# Patient Record
Sex: FEMALE | Race: BLACK OR AFRICAN AMERICAN | NOT HISPANIC OR LATINO | URBAN - METROPOLITAN AREA
[De-identification: names, ages, dates, MRNs, and addresses within clinical notes are randomized per-mention and may not be internally consistent; named-entity substitution may affect disease eponyms.]

---

## 2021-03-24 ENCOUNTER — EMERGENCY (EMERGENCY)
Facility: HOSPITAL | Age: 51
LOS: 1 days | Discharge: HOME | End: 2021-03-24
Attending: EMERGENCY MEDICINE | Admitting: EMERGENCY MEDICINE
Payer: COMMERCIAL

## 2021-03-24 VITALS
OXYGEN SATURATION: 97 % | WEIGHT: 149.91 LBS | RESPIRATION RATE: 18 BRPM | DIASTOLIC BLOOD PRESSURE: 96 MMHG | SYSTOLIC BLOOD PRESSURE: 163 MMHG | HEART RATE: 73 BPM | TEMPERATURE: 98 F

## 2021-03-24 DIAGNOSIS — Z98.890 OTHER SPECIFIED POSTPROCEDURAL STATES: ICD-10-CM

## 2021-03-24 DIAGNOSIS — M89.8X1 OTHER SPECIFIED DISORDERS OF BONE, SHOULDER: ICD-10-CM

## 2021-03-24 DIAGNOSIS — M54.9 DORSALGIA, UNSPECIFIED: ICD-10-CM

## 2021-03-24 DIAGNOSIS — Z98.890 OTHER SPECIFIED POSTPROCEDURAL STATES: Chronic | ICD-10-CM

## 2021-03-24 DIAGNOSIS — Z87.09 PERSONAL HISTORY OF OTHER DISEASES OF THE RESPIRATORY SYSTEM: ICD-10-CM

## 2021-03-24 DIAGNOSIS — R09.1 PLEURISY: ICD-10-CM

## 2021-03-24 LAB
ALBUMIN SERPL ELPH-MCNC: 3.7 G/DL — SIGNIFICANT CHANGE UP (ref 3.4–5)
ALP SERPL-CCNC: 81 U/L — SIGNIFICANT CHANGE UP (ref 40–120)
ALT FLD-CCNC: 25 U/L — SIGNIFICANT CHANGE UP (ref 12–42)
ANION GAP SERPL CALC-SCNC: 4 MMOL/L — LOW (ref 9–16)
AST SERPL-CCNC: 21 U/L — SIGNIFICANT CHANGE UP (ref 15–37)
BASOPHILS # BLD AUTO: 0.02 K/UL — SIGNIFICANT CHANGE UP (ref 0–0.2)
BASOPHILS NFR BLD AUTO: 0.4 % — SIGNIFICANT CHANGE UP (ref 0–2)
BILIRUB SERPL-MCNC: 0.2 MG/DL — SIGNIFICANT CHANGE UP (ref 0.2–1.2)
BUN SERPL-MCNC: 14 MG/DL — SIGNIFICANT CHANGE UP (ref 7–23)
CALCIUM SERPL-MCNC: 10 MG/DL — SIGNIFICANT CHANGE UP (ref 8.5–10.5)
CHLORIDE SERPL-SCNC: 105 MMOL/L — SIGNIFICANT CHANGE UP (ref 96–108)
CO2 SERPL-SCNC: 32 MMOL/L — HIGH (ref 22–31)
CREAT SERPL-MCNC: 0.78 MG/DL — SIGNIFICANT CHANGE UP (ref 0.5–1.3)
D DIMER BLD IA.RAPID-MCNC: <187 NG/ML DDU — SIGNIFICANT CHANGE UP
EOSINOPHIL # BLD AUTO: 0.05 K/UL — SIGNIFICANT CHANGE UP (ref 0–0.5)
EOSINOPHIL NFR BLD AUTO: 1.1 % — SIGNIFICANT CHANGE UP (ref 0–6)
GLUCOSE SERPL-MCNC: 113 MG/DL — HIGH (ref 70–99)
HCG SERPL-ACNC: <1 MIU/ML — SIGNIFICANT CHANGE UP
HCT VFR BLD CALC: 35.8 % — SIGNIFICANT CHANGE UP (ref 34.5–45)
HGB BLD-MCNC: 11.4 G/DL — LOW (ref 11.5–15.5)
IMM GRANULOCYTES NFR BLD AUTO: 0.2 % — SIGNIFICANT CHANGE UP (ref 0–1.5)
LIDOCAIN IGE QN: 127 U/L — SIGNIFICANT CHANGE UP (ref 73–393)
LYMPHOCYTES # BLD AUTO: 2 K/UL — SIGNIFICANT CHANGE UP (ref 1–3.3)
LYMPHOCYTES # BLD AUTO: 44.3 % — HIGH (ref 13–44)
MCHC RBC-ENTMCNC: 28.1 PG — SIGNIFICANT CHANGE UP (ref 27–34)
MCHC RBC-ENTMCNC: 31.8 GM/DL — LOW (ref 32–36)
MCV RBC AUTO: 88.2 FL — SIGNIFICANT CHANGE UP (ref 80–100)
MONOCYTES # BLD AUTO: 0.53 K/UL — SIGNIFICANT CHANGE UP (ref 0–0.9)
MONOCYTES NFR BLD AUTO: 11.8 % — SIGNIFICANT CHANGE UP (ref 2–14)
NEUTROPHILS # BLD AUTO: 1.9 K/UL — SIGNIFICANT CHANGE UP (ref 1.8–7.4)
NEUTROPHILS NFR BLD AUTO: 42.2 % — LOW (ref 43–77)
NRBC # BLD: 0 /100 WBCS — SIGNIFICANT CHANGE UP (ref 0–0)
PLATELET # BLD AUTO: 277 K/UL — SIGNIFICANT CHANGE UP (ref 150–400)
POTASSIUM SERPL-MCNC: 4.7 MMOL/L — SIGNIFICANT CHANGE UP (ref 3.5–5.3)
POTASSIUM SERPL-SCNC: 4.7 MMOL/L — SIGNIFICANT CHANGE UP (ref 3.5–5.3)
PROT SERPL-MCNC: 7.5 G/DL — SIGNIFICANT CHANGE UP (ref 6.4–8.2)
RBC # BLD: 4.06 M/UL — SIGNIFICANT CHANGE UP (ref 3.8–5.2)
RBC # FLD: 13 % — SIGNIFICANT CHANGE UP (ref 10.3–14.5)
SODIUM SERPL-SCNC: 141 MMOL/L — SIGNIFICANT CHANGE UP (ref 132–145)
WBC # BLD: 4.51 K/UL — SIGNIFICANT CHANGE UP (ref 3.8–10.5)
WBC # FLD AUTO: 4.51 K/UL — SIGNIFICANT CHANGE UP (ref 3.8–10.5)

## 2021-03-24 PROCEDURE — 74174 CTA ABD&PLVS W/CONTRAST: CPT | Mod: 26

## 2021-03-24 PROCEDURE — 71275 CT ANGIOGRAPHY CHEST: CPT | Mod: 26

## 2021-03-24 PROCEDURE — 99285 EMERGENCY DEPT VISIT HI MDM: CPT

## 2021-03-24 RX ORDER — MORPHINE SULFATE 50 MG/1
4 CAPSULE, EXTENDED RELEASE ORAL ONCE
Refills: 0 | Status: DISCONTINUED | OUTPATIENT
Start: 2021-03-24 | End: 2021-03-24

## 2021-03-24 RX ORDER — ONDANSETRON 8 MG/1
4 TABLET, FILM COATED ORAL ONCE
Refills: 0 | Status: COMPLETED | OUTPATIENT
Start: 2021-03-24 | End: 2021-03-24

## 2021-03-24 RX ADMIN — MORPHINE SULFATE 4 MILLIGRAM(S): 50 CAPSULE, EXTENDED RELEASE ORAL at 23:00

## 2021-03-24 RX ADMIN — ONDANSETRON 4 MILLIGRAM(S): 8 TABLET, FILM COATED ORAL at 21:52

## 2021-03-24 RX ADMIN — MORPHINE SULFATE 4 MILLIGRAM(S): 50 CAPSULE, EXTENDED RELEASE ORAL at 21:52

## 2021-03-24 NOTE — ED PROVIDER NOTE - OBJECTIVE STATEMENT
50 yof pw pain along mid/upper scapula R, which began around 2am.  denies any sx prior to going to bed.  +pleurisy, worse w/ certain body movement.  no abd pain, no cp, no nv.  last ate a cheeseburger around 3pm.  pain intermittently waxing/waning.  denies any paresthesia to arms/legs.  no neck pain, no ha, no blurry vision.  reports similar feeling in 2008 when she had myomectomy, found to have fluids in the lung.  pt unsure what the cause was, unsure what was done.  LMP 1 wk ago. 50 yof pw pain along mid/upper scapula R, which began around 2am.  denies any sx prior to going to bed.  +pleurisy, worse w/ certain body movement.  no abd pain, no cp, no nv.  last ate a cheeseburger around 3pm.  pain intermittently waxing/waning.  denies any paresthesia to arms/legs.  no neck pain, no ha, no blurry vision.  reports similar feeling in 2008 when she had myomectomy, found to have fluids in the lung.  pt unsure what the cause was, unsure what was done.  LMP 1 wk ago.  denies any unilateral leg pain/swelling, no immobilization/travel/surg/trauma, no exogenous hormone use, no hx of PE/DVT, no FHx of PE/DVT, 50 yof pw pain along mid/upper scapula R, which began around 2am.  denies any sx prior to going to bed.  +pleurisy, worse w/ certain body movement but no sob.  no abd pain, no cp, no nv.  last ate a cheeseburger around 3pm.  pain intermittently waxing/waning.  denies any paresthesia to arms/legs.  no neck pain, no ha, no blurry vision.  reports similar feeling in 2008 when she had myomectomy, found to have fluids in the lung.  pt unsure what the cause was, unsure what was done.  LMP 1 wk ago.  denies any unilateral leg pain/swelling, no immobilization/travel/surg/trauma, no exogenous hormone use, no hx of PE/DVT, no FHx of PE/DVT, 50 yof pw pain along mid/upper scapula R, which began around 2am.  denies any sx prior to going to bed.  +pleurisy, worse w/ certain body movement but no sob.  no abd pain, no cp, no nv.  last ate a cheeseburger around 3pm.  pain intermittently waxing/waning.  denies any paresthesia to arms/legs.  no neck pain, no ha, no blurry vision.  reports similar feeling in 2008 when she had myomectomy, found to have fluids in the lung.  pt unsure what the cause was, unsure what was done.  LMP 1 wk ago.  denies any unilateral leg pain/swelling, no immobilization/travel/surg/trauma, no exogenous hormone use, no hx of PE/DVT, no FHx of PE/DVT.  denies IVDU, denies hx of shoulder instrumentation.

## 2021-03-24 NOTE — ED PROVIDER NOTE - PATIENT PORTAL LINK FT
You can access the FollowMyHealth Patient Portal offered by [facility] by registering at the following website: [email or web address]. By joining [email or web address] portal, you will also be able to view your health information using other applications (apps) compatible with our system.

## 2021-03-24 NOTE — ED PROVIDER NOTE - CLINICAL SUMMARY MEDICAL DECISION MAKING FREE TEXT BOX
pt w/ pleuritic pain along R mid scapula since 2am, intermittently worsening, +pleurisy, no abd pain/nv, nontender, not worse w/ eating, will check labs including d-dimer, lipase, CTA, analgesia pt w/ pleuritic pain along R mid scapula since 2am, intermittently worsening, +pleurisy, no abd pain/nv, nontender, not worse w/ eating, no unilateral leg pain/swelling, no immobilization/travel/surg/trauma, no exogenous hormone use, no hx of PE/DVT, no FHx of PE/DVT, will check labs including d-dimer, lipase, CTA, analgesia pt w/ pleuritic pain along R mid scapula since 2am, intermittently worsening, +pleurisy w/o sob, no abd pain/nv, nontender, not worse w/ eating, no unilateral leg pain/swelling, no immobilization/travel/surg/trauma, no exogenous hormone use, no hx of PE/DVT, no FHx of PE/DVT, will check labs including d-dimer, lipase, CTA, analgesia    d-dimer neg, no tachypnea/tachycardia/hypoxia, still denies any sob, nonreproducible pain along R scapula, initially hypertensive, will obtain CTA r/o dissection,

## 2021-03-24 NOTE — ED ADULT NURSE NOTE - OBJECTIVE STATEMENT
pt presents to ER with right mid/upper back and scapula pain since 2AM this am, worsening with body movement,  Pt sent from UC to ED to r/o PE. Pt tearful with pursed lip breathing. denies N/V/ SOB/dizziness/CP. Sono with Dr Miles at bedside in progress. Pain 9/10.

## 2021-03-24 NOTE — ED PROVIDER NOTE - PHYSICAL EXAMINATION
Physical Exam  GEN: Awake, alert, non-toxic appearing, NCAT  EYES: full EOMI,  ENT: External inspection normal, normal voice,   HEAD: atraumatic  NECK: FROM neck, supple,   RESP: no tachypnea, no hypoxia, no resp distress,  MSK: FROM of R shoulder but reports pain in the scapula, nontender scapula, nontender c/t/l lumbar spine, nontender deltoid/humerus,   SKIN: no rash overlying R shoulder/scapula/back

## 2021-03-24 NOTE — ED ADULT TRIAGE NOTE - CHIEF COMPLAINT QUOTE
Pt BIBA c/o R upper back pain since 2AM. States pain worsening around 5-6PM, went to UC and had no acute changes on XR and EKG. Urgent care told pt to come to ED for r/o PE. States pain worsens with inspiration. Denies cp, sob, dizziness.

## 2021-03-25 VITALS
RESPIRATION RATE: 18 BRPM | OXYGEN SATURATION: 98 % | HEART RATE: 65 BPM | TEMPERATURE: 98 F | DIASTOLIC BLOOD PRESSURE: 80 MMHG | SYSTOLIC BLOOD PRESSURE: 130 MMHG

## 2021-03-25 LAB
BASO STIPL BLD QL SMEAR: PRESENT — SIGNIFICANT CHANGE UP
BASOPHILS # BLD AUTO: 0.03 K/UL — SIGNIFICANT CHANGE UP (ref 0–0.2)
BASOPHILS NFR BLD AUTO: 1.2 % — SIGNIFICANT CHANGE UP (ref 0–2)
CRP SERPL-MCNC: 9 MG/L — SIGNIFICANT CHANGE UP (ref 0–9)
EOSINOPHIL # BLD AUTO: 0.02 K/UL — SIGNIFICANT CHANGE UP (ref 0–0.5)
EOSINOPHIL NFR BLD AUTO: 0.8 % — SIGNIFICANT CHANGE UP (ref 0–6)
GIANT PLATELETS BLD QL SMEAR: PRESENT — SIGNIFICANT CHANGE UP
HCT VFR BLD CALC: 33.9 % — LOW (ref 34.5–45)
HGB BLD-MCNC: 10.8 G/DL — LOW (ref 11.5–15.5)
IMM GRANULOCYTES NFR BLD AUTO: 0.8 % — SIGNIFICANT CHANGE UP (ref 0–1.5)
LG PLATELETS BLD QL AUTO: SLIGHT — SIGNIFICANT CHANGE UP
LYMPHOCYTES # BLD AUTO: 1.42 K/UL — SIGNIFICANT CHANGE UP (ref 1–3.3)
LYMPHOCYTES # BLD AUTO: 56.8 % — HIGH (ref 13–44)
MACROCYTES BLD QL: SLIGHT — SIGNIFICANT CHANGE UP
MANUAL SMEAR VERIFICATION: SIGNIFICANT CHANGE UP
MCHC RBC-ENTMCNC: 27.8 PG — SIGNIFICANT CHANGE UP (ref 27–34)
MCHC RBC-ENTMCNC: 31.9 GM/DL — LOW (ref 32–36)
MCV RBC AUTO: 87.4 FL — SIGNIFICANT CHANGE UP (ref 80–100)
MONOCYTES # BLD AUTO: 0.08 K/UL — SIGNIFICANT CHANGE UP (ref 0–0.9)
MONOCYTES NFR BLD AUTO: 3.2 % — SIGNIFICANT CHANGE UP (ref 2–14)
NEUTROPHILS # BLD AUTO: 0.93 K/UL — LOW (ref 1.8–7.4)
NEUTROPHILS NFR BLD AUTO: 37.2 % — LOW (ref 43–77)
NRBC # BLD: 0 /100 WBCS — SIGNIFICANT CHANGE UP (ref 0–0)
PLAT MORPH BLD: ABNORMAL
PLATELET # BLD AUTO: 244 K/UL — SIGNIFICANT CHANGE UP (ref 150–400)
RBC # BLD: 3.88 M/UL — SIGNIFICANT CHANGE UP (ref 3.8–5.2)
RBC # FLD: 13.2 % — SIGNIFICANT CHANGE UP (ref 10.3–14.5)
RBC BLD AUTO: ABNORMAL
SARS-COV-2 RNA SPEC QL NAA+PROBE: SIGNIFICANT CHANGE UP
WBC # BLD: 2.5 K/UL — LOW (ref 3.8–10.5)
WBC # FLD AUTO: 2.5 K/UL — LOW (ref 3.8–10.5)

## 2021-03-25 RX ORDER — KETOROLAC TROMETHAMINE 30 MG/ML
15 SYRINGE (ML) INJECTION ONCE
Refills: 0 | Status: DISCONTINUED | OUTPATIENT
Start: 2021-03-25 | End: 2021-03-25

## 2021-03-25 RX ADMIN — Medication 15 MILLIGRAM(S): at 02:31

## 2021-03-25 RX ADMIN — Medication 15 MILLIGRAM(S): at 00:59

## 2021-03-25 NOTE — ED ADULT NURSE REASSESSMENT NOTE - NS ED NURSE REASSESS COMMENT FT1
Patient remains comfortably in stretcher asleep. Medicated with pain medication as ordered by md. Safety and comfort measures in place and maintained. Awaiting further disposition.

## 2021-07-26 ENCOUNTER — EMERGENCY (EMERGENCY)
Facility: HOSPITAL | Age: 51
LOS: 1 days | Discharge: HOME | End: 2021-07-26
Admitting: EMERGENCY MEDICINE
Payer: COMMERCIAL

## 2021-07-26 VITALS
HEIGHT: 64 IN | RESPIRATION RATE: 16 BRPM | SYSTOLIC BLOOD PRESSURE: 147 MMHG | DIASTOLIC BLOOD PRESSURE: 81 MMHG | OXYGEN SATURATION: 99 % | HEART RATE: 81 BPM | WEIGHT: 145.06 LBS | TEMPERATURE: 98 F

## 2021-07-26 DIAGNOSIS — M54.30 SCIATICA, UNSPECIFIED SIDE: ICD-10-CM

## 2021-07-26 DIAGNOSIS — Z87.09 PERSONAL HISTORY OF OTHER DISEASES OF THE RESPIRATORY SYSTEM: ICD-10-CM

## 2021-07-26 DIAGNOSIS — D25.9 LEIOMYOMA OF UTERUS, UNSPECIFIED: ICD-10-CM

## 2021-07-26 DIAGNOSIS — Z98.890 OTHER SPECIFIED POSTPROCEDURAL STATES: Chronic | ICD-10-CM

## 2021-07-26 DIAGNOSIS — M25.551 PAIN IN RIGHT HIP: ICD-10-CM

## 2021-07-26 PROCEDURE — 99284 EMERGENCY DEPT VISIT MOD MDM: CPT

## 2021-07-26 PROCEDURE — 93971 EXTREMITY STUDY: CPT | Mod: 26,RT

## 2021-07-26 RX ORDER — METHOCARBAMOL 500 MG/1
2 TABLET, FILM COATED ORAL
Qty: 30 | Refills: 0
Start: 2021-07-26 | End: 2021-07-30

## 2021-07-26 RX ORDER — OXYCODONE AND ACETAMINOPHEN 5; 325 MG/1; MG/1
1 TABLET ORAL
Qty: 10 | Refills: 0
Start: 2021-07-26

## 2021-07-26 RX ORDER — OXYCODONE AND ACETAMINOPHEN 5; 325 MG/1; MG/1
1 TABLET ORAL ONCE
Refills: 0 | Status: DISCONTINUED | OUTPATIENT
Start: 2021-07-26 | End: 2021-07-26

## 2021-07-26 RX ORDER — METHOCARBAMOL 500 MG/1
1500 TABLET, FILM COATED ORAL ONCE
Refills: 0 | Status: COMPLETED | OUTPATIENT
Start: 2021-07-26 | End: 2021-07-26

## 2021-07-26 NOTE — ED ADULT TRIAGE NOTE - CHIEF COMPLAINT QUOTE
Walks into ED sent from [facility] c/o right lower extremity pain that started gradually 1 week pta, with peak severity last night. Denies relevant pmh, no prior DVT's.

## 2021-07-26 NOTE — ED PROVIDER NOTE - PATIENT PORTAL LINK FT
You can access the FollowMyHealth Patient Portal offered by [facility] by registering at the following website: [email or web address]. By joining [email or web address]’s FollowMyHealth portal, you will also be able to view your health information using other applications (apps) compatible with our system.

## 2021-07-26 NOTE — ED ADULT NURSE NOTE - OBJECTIVE STATEMENT
50y female presents to ED c/o right leg. Pt states pain has been present x1week, starts in right hip and radiates down leg. Hx of sciatica, takes aleve for pain, has not felt relief. Pedal pulses strong and present. Denies sob, hormone use, recent travel. A&Ox4.

## 2021-07-26 NOTE — ED PROVIDER NOTE - CARE PROVIDER_API CALL
Josh Kern)  Neurology  [address], [place] [address]  Phone: [phone]  Fax: [phone]  Follow Up Time: 1-3 Days

## 2021-07-26 NOTE — ED PROVIDER NOTE - IV ALTEPLASE EXCL REL HIDDEN
show
Pt reports good PO intake and appetite PTA, consumes regular diet but tries to watch intake of high sodium and sugary foods. Confirms NKFA. Pt denies chewing/swallowing difficulty, nausea, vomiting, diarrhea, constipation PTA. Takes vitamin D and vitamin B12 supplements

## 2021-07-26 NOTE — ED PROVIDER NOTE - OBJECTIVE STATEMENT
49 y/o female with PMH of Sciatica and Fibroids presents to the ED with 1 week of progressive pain originating from her right hip/buttock region and radiating down the RLE with associated paresthesias. The pain is moderate-to-severe in nature, making it painful to walk at times. She has taken Aleve for the pain but reports only minimal improvement so she went to [facility] where she was referred here for further evaluation.    Denies fever, chills, headache, dizziness, syncope, CP, SOB, palpitations, saddle anesthesia, LE weakness, urinary or bowel incontinence, recent fall or injury

## 2021-07-26 NOTE — ED PROVIDER NOTE - CLINICAL SUMMARY MEDICAL DECISION MAKING FREE TEXT BOX
US negative for DVT. Clinical presentation is c/w likely sciatica of the right side. No focal deficits on exam. Pt is well-appearing and sitting comfortably in NAD. Supportive care instructions, Rx analgesics, muscle relaxants and F/U with Neuro this week. Strict return precautions reviewed with pt in which pt verbalizes understanding and agrees to.

## 2021-07-27 PROBLEM — Z00.00 ENCOUNTER FOR PREVENTIVE HEALTH EXAMINATION: Status: ACTIVE | Noted: 2021-07-27

## 2021-07-27 RX ADMIN — METHOCARBAMOL 1500 MILLIGRAM(S): 500 TABLET, FILM COATED ORAL at 00:15

## 2021-07-27 RX ADMIN — OXYCODONE AND ACETAMINOPHEN 1 TABLET(S): 5; 325 TABLET ORAL at 00:15

## 2021-08-12 ENCOUNTER — APPOINTMENT (OUTPATIENT)
Dept: NEUROSURGERY | Facility: CLINIC | Age: 51
End: 2021-08-12
Payer: COMMERCIAL

## 2021-08-12 VITALS — HEIGHT: 64 IN | WEIGHT: 145 LBS | BODY MASS INDEX: 24.75 KG/M2

## 2021-08-12 PROCEDURE — 99203 OFFICE O/P NEW LOW 30 MIN: CPT

## 2021-08-13 PROBLEM — M54.30 SCIATICA, UNSPECIFIED SIDE: Chronic | Status: ACTIVE | Noted: 2021-07-26

## 2021-08-13 PROBLEM — D21.9 BENIGN NEOPLASM OF CONNECTIVE AND OTHER SOFT TISSUE, UNSPECIFIED: Chronic | Status: ACTIVE | Noted: 2021-07-26

## 2021-09-30 ENCOUNTER — NON-APPOINTMENT (OUTPATIENT)
Age: 51
End: 2021-09-30

## 2021-09-30 ENCOUNTER — APPOINTMENT (OUTPATIENT)
Dept: PHYSICAL MEDICINE AND REHAB | Facility: CLINIC | Age: 51
End: 2021-09-30
Payer: COMMERCIAL

## 2021-09-30 VITALS
SYSTOLIC BLOOD PRESSURE: 127 MMHG | RESPIRATION RATE: 18 BRPM | BODY MASS INDEX: 24.75 KG/M2 | TEMPERATURE: 98.2 F | HEART RATE: 87 BPM | WEIGHT: 145 LBS | HEIGHT: 64 IN | DIASTOLIC BLOOD PRESSURE: 85 MMHG

## 2021-09-30 DIAGNOSIS — M94.0 CHONDROCOSTAL JUNCTION SYNDROME [TIETZE]: ICD-10-CM

## 2021-09-30 DIAGNOSIS — M54.16 RADICULOPATHY, LUMBAR REGION: ICD-10-CM

## 2021-09-30 DIAGNOSIS — Z83.3 FAMILY HISTORY OF DIABETES MELLITUS: ICD-10-CM

## 2021-09-30 DIAGNOSIS — M79.18 MYALGIA, OTHER SITE: ICD-10-CM

## 2021-09-30 DIAGNOSIS — M89.8X8 OTHER SPECIFIED DISORDERS OF BONE, OTHER SITE: ICD-10-CM

## 2021-09-30 DIAGNOSIS — Z86.2 PERSONAL HISTORY OF DISEASES OF THE BLOOD AND BLOOD-FORMING ORGANS AND CERTAIN DISORDERS INVOLVING THE IMMUNE MECHANISM: ICD-10-CM

## 2021-09-30 DIAGNOSIS — R52 PAIN, UNSPECIFIED: ICD-10-CM

## 2021-09-30 PROCEDURE — 99244 OFF/OP CNSLTJ NEW/EST MOD 40: CPT

## 2021-09-30 NOTE — REVIEW OF SYSTEMS
[Patient Intake Form Reviewed] : Patient intake form was reviewed [Fever] : no fever [Chills] : no chills [Fatigue] : no fatigue [Recent Change In Weight] : ~T no recent weight change [Leg Claudication] : no intermittent leg claudication [Lower Ext Edema] : no lower extremity edema [Muscle Pain] : muscle pain [Muscle Weakness] : no muscle weakness [Difficulty Walking] : no difficulty walking [Negative] : Heme/Lymph

## 2021-09-30 NOTE — ASSESSMENT
[FreeTextEntry1] : \par PLAN AND RECOMMENDATIONS :\par \par We discussed differential diagnosis and clinical impression\par agree with EMG assess LS radiculopathy \par \par Recommend\par .symptomatic care and support\par  medications NSAIDS as needed -  OTC fine (-personal preference )-(once or twice a day), -warned of  possible GI side effects -advised to take with meals or add over the counter Nexium, if sensitive\par \par  imaging done await MRI to correlate with EMG \par \par  hydrotherapy /heat / cold for pain\par  continue  spinal precautions including care with bending, lifting, twisting and  carrying.\par \par \par  relative rest and avoidance of painful activity where possible \par  increasing activity as discussed \par  return for EMG \par Information given to patient about EMG and Nerve Conduction Study Examination including  planning, differential diagnosis to rule in /rule out ,duration of the test ,precautions (if patient on blood thinner.has bleeding disorder or  pace maker device etc -still possible to undergo with care), side effects(benign-limited to short term bruising and discomfort/pain)  \par The protocol of temp checks upon arrival ,disinfection procedure of waiting room and the lab explained- reassured. \par All questions answered. \par Patient instructed to book appointment upon conclusion of appointment\par \par Information sheet ' Answers to your Questions on EMG " forwarded to patient to read prior to testing, with further information about training,background and the procedure itself .\par \par \par I certify that > 50 % of time spent was spent on counselling and coordination of care and more than 50% face to face directly \par Time spent including review of documents /records/decision making /discussion  amounted  > 60 minutes\par

## 2021-09-30 NOTE — CONSULT LETTER
[FreeTextEntry1] : Dear Dr. RANCHO LAU  , Dr CORIE HAWLEY \par \par I had the pleasure of evaluating your patient, SEVERO ACEVEDO .\par \par Thank you very much for allowing me to participate in the care of this patient. If you have any questions, please do not hesitate to contact me. \par \par Sincerely, \par Oscar Cordon MD \par \par ABPMR Board Certified in Physical Medicine and Rehabilitation\par Certified Fellow of AANEM (Neuromuscular and Electrodiagnostic Medicine)\par Subspecialty certified in Sports Medicine (ABPMR)\par \par  of Physical Medicine and Rehabilitation\par [facility] [employer] [facility]\par [facility]\par \par

## 2021-09-30 NOTE — HISTORY OF PRESENT ILLNESS
[FreeTextEntry1] : Ms. SEVERO ACEVEDO is a very pleasant 51 year female seen for evaluation of low back pain radiating to the R  lower extremity that has been ongoing for a long time but very bad last 6 months -also has been shooting down left leg but now mainly R   without any specific injury or inciting event. The pain is located primarily R upper lumbar gluteal area iliac crest  intermittent in nature and described as sharp cramping  . The pain is rated as 2/10 during today's visit, and ranges from 2-8/10. The patient's symptoms are aggravated by walking bj getting up from a chair she had such bad spasm could not move 2 months ago went to ER   and alleviated by rest and medication  . The patient denies any numbness/tingling, weakness, or bowel/bladder dysfunction. 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

## 2021-09-30 NOTE — PHYSICAL EXAM
[FreeTextEntry1] : PHYSICAL EXAM : OBJECTIVE \par \par GENERAL : Awake ,alert and oriented to time place and person \par HEAD : normocephalic and atraumatic \par NECK : supple ,no tracheal deviation ,no thyroid enlargement noted with swallowing\par EYES : sclera and conjunctiva normal no redness,intact extraocular movements \par ENT  : ears and nose normal in appearance -hearing adequate \par PULMONARY: effort normal. No respiratory distress. breathing regular. No wheezes \par LYMPH : No swelling in limbs, capillary return within normal range \par CVS : warm extremities,no palpitations,not short of breath, no visible jugular venous distention\par PSYCH : mood and affect normal ,good eye contact ,normal attention \par ABDOMEN : no visible distension , \par NEUROLOGICAL:cranial nerves intact,muscle tone normal,gait and balance safe except where noted below \par SKIN : warm and dry No rash detected over specific body areas examined \par MUSCULOSKELETAL: normal muscle bulk, no focal bony tenderness /posture normal except where specified below\par ROM spine FF 40 pain \par SLR neg R and L \par hip ROM good \par No long tract signs found on clinical exam and no focal neurological deficits\par EHL 5/5 \par vibration intact \par tender over R iliac crest as well as lower rib margins \par Pepper neg \par \par reflexes NL no clonus no hoffmans \par gait NL \par not much pain today during exam except with palpation she is tender

## 2021-10-18 ENCOUNTER — APPOINTMENT (OUTPATIENT)
Dept: PHYSICAL MEDICINE AND REHAB | Facility: CLINIC | Age: 51
End: 2021-10-18
Payer: COMMERCIAL

## 2021-10-18 VITALS
HEART RATE: 78 BPM | BODY MASS INDEX: 24.75 KG/M2 | RESPIRATION RATE: 18 BRPM | DIASTOLIC BLOOD PRESSURE: 80 MMHG | TEMPERATURE: 97.7 F | SYSTOLIC BLOOD PRESSURE: 121 MMHG | HEIGHT: 64 IN | WEIGHT: 145 LBS

## 2021-10-18 PROCEDURE — 95886 MUSC TEST DONE W/N TEST COMP: CPT

## 2021-10-18 PROCEDURE — 95911 NRV CNDJ TEST 9-10 STUDIES: CPT

## 2021-12-09 ENCOUNTER — APPOINTMENT (OUTPATIENT)
Dept: NEUROSURGERY | Facility: CLINIC | Age: 51
End: 2021-12-09
Payer: COMMERCIAL

## 2021-12-09 PROCEDURE — 99441: CPT

## 2021-12-09 NOTE — HISTORY OF PRESENT ILLNESS
[FreeTextEntry1] : Reviewed MRI and EMG with patient.  \par \par MRI lumbar spine no abnormality; large fibroid uterus.\par \par There is no indication for intervention on the patient's spine.  I believe she would benefit from treating her fibroid uterus.\par \par Please see EMG report in chart.\par \par She will follow-up as needed.\par \par This consultation took 5 minutes.

## 2021-12-28 ENCOUNTER — APPOINTMENT (OUTPATIENT)
Dept: RHEUMATOLOGY | Facility: CLINIC | Age: 51
End: 2021-12-28
Payer: COMMERCIAL

## 2021-12-28 ENCOUNTER — NON-APPOINTMENT (OUTPATIENT)
Age: 51
End: 2021-12-28

## 2021-12-28 VITALS
HEART RATE: 75 BPM | DIASTOLIC BLOOD PRESSURE: 79 MMHG | SYSTOLIC BLOOD PRESSURE: 123 MMHG | HEIGHT: 64 IN | BODY MASS INDEX: 25.61 KG/M2 | OXYGEN SATURATION: 99 % | WEIGHT: 150 LBS | TEMPERATURE: 97.8 F

## 2021-12-28 DIAGNOSIS — M54.16 RADICULOPATHY, LUMBAR REGION: ICD-10-CM

## 2021-12-28 DIAGNOSIS — M25.471 EFFUSION, RIGHT ANKLE: ICD-10-CM

## 2021-12-28 DIAGNOSIS — M25.472 EFFUSION, RIGHT ANKLE: ICD-10-CM

## 2021-12-28 DIAGNOSIS — I87.2 VENOUS INSUFFICIENCY (CHRONIC) (PERIPHERAL): ICD-10-CM

## 2021-12-28 DIAGNOSIS — M25.69 STIFFNESS OF OTHER SPECIFIED JOINT, NOT ELSEWHERE CLASSIFIED: ICD-10-CM

## 2021-12-28 DIAGNOSIS — D25.9 LEIOMYOMA OF UTERUS, UNSPECIFIED: ICD-10-CM

## 2021-12-28 PROCEDURE — 99205 OFFICE O/P NEW HI 60 MIN: CPT | Mod: 25

## 2021-12-28 PROCEDURE — 36415 COLL VENOUS BLD VENIPUNCTURE: CPT

## 2021-12-28 RX ORDER — MV-MIN/FOLIC/VIT K/LYCOP/COQ10 200-100MCG
CAPSULE ORAL
Refills: 0 | Status: ACTIVE | COMMUNITY

## 2021-12-29 PROBLEM — D25.9 UTERINE LEIOMYOMA, UNSPECIFIED LOCATION: Status: ACTIVE | Noted: 2021-12-28

## 2021-12-29 PROBLEM — M54.16 LUMBAR POLYRADICULOPATHY: Status: ACTIVE | Noted: 2021-10-18

## 2021-12-29 PROBLEM — M25.471 SWOLLEN ANKLES: Status: ACTIVE | Noted: 2021-10-18

## 2021-12-29 PROBLEM — I87.2 VENOUS INSUFFICIENCY: Status: ACTIVE | Noted: 2021-12-29

## 2021-12-29 NOTE — HISTORY OF PRESENT ILLNESS
[Arthralgias] : arthralgias [Joint Swelling] : joint swelling [FreeTextEntry1] : Referred by Dr. Oscar Cordon for Rheumatology consultation \par \par 50 y/o very pleasant  F was referred for b/l chronic  ankle swelling.\par Has hx thyroid nodule gets monitoring,  pleuritis after myomectomy surgery in 2008 \par chronic low back pain radiating to the R >left LE/ sciatica symptoms. \par EMG b/l lumbosacral polyradiculopathy ,  without peripheral neuropathy.  MRI negative for disc bulge, canal stenosis or or neural foramen  narrowing, found enlarged uterus with multiple fibroids. Saw NeuroSx who did not recommend any intervention as has normal MRI and though her symptoms are due to fibroids and suggested gyn evaluation which has been pending. \par Has chronic ankle swelling recurrent, worse during summer time, had xrays done and told normal in the past. \par ankle and dorsal foot swelling. \par Pt describes LE/ankle heavy sensation without pain worse toward the end of the day, she has less symptoms or no symptoms in the morning. \par sometimes hip pain  and walking immediately after sitting for long period of time is difficult. Low back pain or hip pain does not improve after activity and does not wake up at night with pain. \par Fingertips feels dry despite using moisturizing lotion. \par Does not exercise \par \par No h/o morning stiffness, memory loss, patchy hair loss, sicca symptoms, photosensitivity, HA,  Raynaud's, oral ulcers, nasal ulcers. \par No history of pericarditis \par No history of protein/hematuria \par No history of cytopenias. \par No Hx of VTE/DVT/PE\par Personal or family hx of autoimmune disease, no hx of psoriasis\par  [Anorexia] : no anorexia [Weight Loss] : no weight loss [Malaise] : no malaise [Fever] : no fever [Chills] : no chills [Fatigue] : no fatigue [Depression] : no depression [Malar Facial Rash] : no malar facial rash [Skin Lesions] : no lesions [Skin Nodules] : no skin nodules [Oral Ulcers] : no oral ulcers [Cough] : no cough [Dry Mouth] : no dry mouth [Dysphonia] : no dysphonia [Dysphagia] : no dysphagia [Shortness of Breath] : no shortness of breath [Joint Warmth] : no joint warmth [Joint Deformity] : no joint deformity [Decreased ROM] : no decreased range of motion [Morning Stiffness] : no morning stiffness [Falls] : no falls [Difficulty Standing] : no difficulty standing [Difficulty Walking] : no difficulty walking [Dyspnea] : no dyspnea [Myalgias] : no myalgias [Muscle Weakness] : no muscle weakness [Muscle Spasms] : no muscle spasms [Muscle Cramping] : no muscle cramping [Visual Changes] : no visual changes [Eye Pain] : no eye pain [Eye Redness] : no eye redness [Dry Eyes] : no dry eyes

## 2021-12-29 NOTE — ASSESSMENT
[FreeTextEntry1] : 52 y/o  referred for b/l chronic  ankle swelling for past few years. \par chronic low back pain radiating to the R >left LE/ sciatica symptoms. \par EMG b/l lumbosacral polyradiculopathy.  MRI negative for disc bulge, canal stenosis or or neural foramen  narrowing, found enlarged uterus with multiple fibroid, pending  gyn evaluation. \par At present pt does not have LE/ankle edema \par Her ankle swelling is likely due fibroid causing pelvic vein compression or congestion syndrome or LE venous insufficiency \par with negative ROS and exam I have low suspicious for underlying autoimmune disease/inflammatory arthritis to explain her presentation. \par I will refer pt to vascular Dr. Olivares and strongly recommended to make appt with gyn to discuss fibroid treatment options. \par We talked to check basic rheum labs and xray ankle for justification. ( labs done during the visit today) \par \par f/u in 6 weeks to review labs/images \par  \par

## 2021-12-29 NOTE — REVIEW OF SYSTEMS
[Pelvic Pain] : pelvic pain [Arthralgias] : arthralgias [Joint Swelling] : joint swelling [Limb Pain] : limb pain [Negative] : Gastrointestinal [Itching] : no itching [Dizziness] : no dizziness [Fainting] : no fainting [Easy Bleeding] : no tendency for easy bleeding [Easy Bruising] : no tendency for easy bruising [Swollen Glands] : no swollen glands [Swollen Glands In The Neck] : no swollen glands in the neck

## 2021-12-29 NOTE — PHYSICAL EXAM
[General Appearance - Alert] : alert [General Appearance - In No Acute Distress] : in no acute distress [General Appearance - Well Nourished] : well nourished [General Appearance - Well Developed] : well developed [General Appearance - Well-Appearing] : healthy appearing [Sclera] : the sclera and conjunctiva were normal [Outer Ear] : the ears and nose were normal in appearance [Examination Of The Oral Cavity] : the lips and gums were normal [Oropharynx] : the oropharynx was normal [Neck Appearance] : the appearance of the neck was normal [Neck Cervical Mass (___cm)] : no neck mass was observed [Jugular Venous Distention Increased] : there was no jugular-venous distention [Respiration, Rhythm And Depth] : normal respiratory rhythm and effort [Exaggerated Use Of Accessory Muscles For Inspiration] : no accessory muscle use [Auscultation Breath Sounds / Voice Sounds] : lungs were clear to auscultation bilaterally [Heart Rate And Rhythm] : heart rate was normal and rhythm regular [Heart Sounds] : normal S1 and S2 [Murmurs] : no murmurs [Full Pulse] : the pedal pulses are present [Edema] : there was no peripheral edema [Veins - Varicosity Changes] : there were no varicosital changes [Abdomen Soft] : soft [Cervical Lymph Nodes Enlarged Posterior Bilaterally] : posterior cervical [Cervical Lymph Nodes Enlarged Anterior Bilaterally] : anterior cervical [Supraclavicular Lymph Nodes Enlarged Bilaterally] : supraclavicular [Axillary Lymph Nodes Enlarged Bilaterally] : axillary [No CVA Tenderness] : no ~M costovertebral angle tenderness [No Spinal Tenderness] : no spinal tenderness [Abnormal Walk] : normal gait [Nail Clubbing] : no clubbing  or cyanosis of the fingernails [Motor Tone] : muscle strength and tone were normal [Skin Color & Pigmentation] : normal skin color and pigmentation [Skin Turgor] : normal skin turgor [] : no rash [Skin Lesions] : no skin lesions [Motor Exam] : the motor exam was normal [Oriented To Time, Place, And Person] : oriented to person, place, and time [Impaired Insight] : insight and judgment were intact

## 2022-01-03 LAB
ALBUMIN MFR SERPL ELPH: 61.4 %
ALBUMIN SERPL ELPH-MCNC: 4.7 G/DL
ALBUMIN SERPL-MCNC: 4.5 G/DL
ALBUMIN/GLOB SERPL: 1.6 RATIO
ALP BLD-CCNC: 87 U/L
ALPHA1 GLOB MFR SERPL ELPH: 4 %
ALPHA1 GLOB SERPL ELPH-MCNC: 0.3 G/DL
ALPHA2 GLOB MFR SERPL ELPH: 8.1 %
ALPHA2 GLOB SERPL ELPH-MCNC: 0.6 G/DL
ALT SERPL-CCNC: 19 U/L
ANACR T: NEGATIVE
ANION GAP SERPL CALC-SCNC: 13 MMOL/L
AST SERPL-CCNC: 21 U/L
B-GLOBULIN MFR SERPL ELPH: 12.7 %
B-GLOBULIN SERPL ELPH-MCNC: 0.9 G/DL
BASOPHILS # BLD AUTO: 0.02 K/UL
BASOPHILS NFR BLD AUTO: 0.5 %
BILIRUB SERPL-MCNC: 0.3 MG/DL
BUN SERPL-MCNC: 11 MG/DL
C3 SERPL-MCNC: 146 MG/DL
C4 SERPL-MCNC: 46 MG/DL
CALCIUM SERPL-MCNC: 10.1 MG/DL
CCP AB SER IA-ACNC: <8 UNITS
CHLORIDE SERPL-SCNC: 102 MMOL/L
CO2 SERPL-SCNC: 24 MMOL/L
CREAT SERPL-MCNC: 0.76 MG/DL
CRP SERPL-MCNC: 3 MG/L
DEPRECATED KAPPA LC FREE/LAMBDA SER: 1.14 RATIO
ENA SS-A AB SER IA-ACNC: <0.2 AL
ENA SS-B AB SER IA-ACNC: <0.2 AL
ENDOMYSIUM IGA SER QL: NEGATIVE
ENDOMYSIUM IGA TITR SER: NORMAL
EOSINOPHIL # BLD AUTO: 0.04 K/UL
EOSINOPHIL NFR BLD AUTO: 1 %
ERYTHROCYTE [SEDIMENTATION RATE] IN BLOOD BY WESTERGREN METHOD: 46 MM/HR
FOLATE SERPL-MCNC: 14 NG/ML
GAMMA GLOB FLD ELPH-MCNC: 1 G/DL
GAMMA GLOB MFR SERPL ELPH: 13.8 %
GLUCOSE SERPL-MCNC: 95 MG/DL
HCT VFR BLD CALC: 39.4 %
HGB BLD-MCNC: 11.8 G/DL
IGA SER QL IEP: 172 MG/DL
IGG SER QL IEP: 1234 MG/DL
IGM SER QL IEP: 71 MG/DL
IMM GRANULOCYTES NFR BLD AUTO: 0 %
INTERPRETATION SERPL IEP-IMP: NORMAL
KAPPA LC CSF-MCNC: 1.05 MG/DL
KAPPA LC SERPL-MCNC: 1.2 MG/DL
LYMPHOCYTES # BLD AUTO: 1.86 K/UL
LYMPHOCYTES NFR BLD AUTO: 47.1 %
M PROTEIN SPEC IFE-MCNC: NORMAL
MAN DIFF?: NORMAL
MCHC RBC-ENTMCNC: 26.9 PG
MCHC RBC-ENTMCNC: 29.9 GM/DL
MCV RBC AUTO: 90 FL
MONOCYTES # BLD AUTO: 0.31 K/UL
MONOCYTES NFR BLD AUTO: 7.8 %
NEUTROPHILS # BLD AUTO: 1.72 K/UL
NEUTROPHILS NFR BLD AUTO: 43.6 %
PLATELET # BLD AUTO: 334 K/UL
POTASSIUM SERPL-SCNC: 4.4 MMOL/L
PROT SERPL-MCNC: 7.4 G/DL
RBC # BLD: 4.38 M/UL
RBC # FLD: 14.5 %
RF+CCP IGG SER-IMP: NEGATIVE
RHEUMATOID FACT SER QL: <10 IU/ML
SODIUM SERPL-SCNC: 139 MMOL/L
TSH SERPL-ACNC: 1.34 UIU/ML
TTG IGA SER IA-ACNC: <1.2 U/ML
TTG IGA SER-ACNC: NEGATIVE
URATE SERPL-MCNC: 3.6 MG/DL
VIT B12 SERPL-MCNC: 355 PG/ML
WBC # FLD AUTO: 3.95 K/UL

## 2022-01-10 LAB — HLA-B27 RELATED AG QL: NEGATIVE

## 2022-01-25 ENCOUNTER — APPOINTMENT (OUTPATIENT)
Dept: VASCULAR SURGERY | Facility: CLINIC | Age: 52
End: 2022-01-25

## 2022-02-08 ENCOUNTER — APPOINTMENT (OUTPATIENT)
Dept: RHEUMATOLOGY | Facility: CLINIC | Age: 52
End: 2022-02-08

## 2022-03-03 ENCOUNTER — INPATIENT (INPATIENT)
Facility: HOSPITAL | Age: 52
LOS: 0 days | Discharge: HOME | DRG: 761 | End: 2022-03-04
Attending: GENERAL ACUTE CARE HOSPITAL | Admitting: SURGERY
Payer: COMMERCIAL

## 2022-03-03 VITALS
DIASTOLIC BLOOD PRESSURE: 93 MMHG | RESPIRATION RATE: 17 BRPM | HEIGHT: 64 IN | OXYGEN SATURATION: 98 % | TEMPERATURE: 98 F | HEART RATE: 75 BPM | WEIGHT: 145.06 LBS | SYSTOLIC BLOOD PRESSURE: 150 MMHG

## 2022-03-03 DIAGNOSIS — Z98.890 OTHER SPECIFIED POSTPROCEDURAL STATES: Chronic | ICD-10-CM

## 2022-03-03 LAB
ALBUMIN SERPL ELPH-MCNC: 3.9 G/DL — SIGNIFICANT CHANGE UP (ref 3.4–5)
ALP SERPL-CCNC: 99 U/L — SIGNIFICANT CHANGE UP (ref 40–120)
ALT FLD-CCNC: 28 U/L — SIGNIFICANT CHANGE UP (ref 12–42)
ANION GAP SERPL CALC-SCNC: 6 MMOL/L — LOW (ref 9–16)
APPEARANCE UR: CLEAR — SIGNIFICANT CHANGE UP
AST SERPL-CCNC: 22 U/L — SIGNIFICANT CHANGE UP (ref 15–37)
BASOPHILS # BLD AUTO: 0.03 K/UL — SIGNIFICANT CHANGE UP (ref 0–0.2)
BASOPHILS NFR BLD AUTO: 0.4 % — SIGNIFICANT CHANGE UP (ref 0–2)
BILIRUB SERPL-MCNC: 0.3 MG/DL — SIGNIFICANT CHANGE UP (ref 0.2–1.2)
BILIRUB UR-MCNC: NEGATIVE — SIGNIFICANT CHANGE UP
BUN SERPL-MCNC: 9 MG/DL — SIGNIFICANT CHANGE UP (ref 7–23)
CALCIUM SERPL-MCNC: 10 MG/DL — SIGNIFICANT CHANGE UP (ref 8.5–10.5)
CHLORIDE SERPL-SCNC: 104 MMOL/L — SIGNIFICANT CHANGE UP (ref 96–108)
CO2 SERPL-SCNC: 30 MMOL/L — SIGNIFICANT CHANGE UP (ref 22–31)
COLOR SPEC: YELLOW — SIGNIFICANT CHANGE UP
CREAT SERPL-MCNC: 0.71 MG/DL — SIGNIFICANT CHANGE UP (ref 0.5–1.3)
DIFF PNL FLD: ABNORMAL
EGFR: 102 ML/MIN/1.73M2 — SIGNIFICANT CHANGE UP
EOSINOPHIL # BLD AUTO: 0.02 K/UL — SIGNIFICANT CHANGE UP (ref 0–0.5)
EOSINOPHIL NFR BLD AUTO: 0.3 % — SIGNIFICANT CHANGE UP (ref 0–6)
GLUCOSE SERPL-MCNC: 141 MG/DL — HIGH (ref 70–99)
GLUCOSE UR QL: NEGATIVE — SIGNIFICANT CHANGE UP
HCG SERPL-ACNC: 2 MIU/ML — SIGNIFICANT CHANGE UP
HCG UR QL: NEGATIVE — SIGNIFICANT CHANGE UP
HCT VFR BLD CALC: 39.3 % — SIGNIFICANT CHANGE UP (ref 34.5–45)
HGB BLD-MCNC: 12.4 G/DL — SIGNIFICANT CHANGE UP (ref 11.5–15.5)
IMM GRANULOCYTES NFR BLD AUTO: 0.4 % — SIGNIFICANT CHANGE UP (ref 0–1.5)
KETONES UR-MCNC: NEGATIVE — SIGNIFICANT CHANGE UP
LEUKOCYTE ESTERASE UR-ACNC: NEGATIVE — SIGNIFICANT CHANGE UP
LIDOCAIN IGE QN: 74 U/L — SIGNIFICANT CHANGE UP (ref 73–393)
LYMPHOCYTES # BLD AUTO: 1.35 K/UL — SIGNIFICANT CHANGE UP (ref 1–3.3)
LYMPHOCYTES # BLD AUTO: 17.7 % — SIGNIFICANT CHANGE UP (ref 13–44)
MCHC RBC-ENTMCNC: 27.3 PG — SIGNIFICANT CHANGE UP (ref 27–34)
MCHC RBC-ENTMCNC: 31.6 GM/DL — LOW (ref 32–36)
MCV RBC AUTO: 86.4 FL — SIGNIFICANT CHANGE UP (ref 80–100)
MONOCYTES # BLD AUTO: 0.34 K/UL — SIGNIFICANT CHANGE UP (ref 0–0.9)
MONOCYTES NFR BLD AUTO: 4.5 % — SIGNIFICANT CHANGE UP (ref 2–14)
NEUTROPHILS # BLD AUTO: 5.86 K/UL — SIGNIFICANT CHANGE UP (ref 1.8–7.4)
NEUTROPHILS NFR BLD AUTO: 76.7 % — SIGNIFICANT CHANGE UP (ref 43–77)
NITRITE UR-MCNC: NEGATIVE — SIGNIFICANT CHANGE UP
NRBC # BLD: 0 /100 WBCS — SIGNIFICANT CHANGE UP (ref 0–0)
PH UR: 5.5 — SIGNIFICANT CHANGE UP (ref 5–8)
PLATELET # BLD AUTO: 269 K/UL — SIGNIFICANT CHANGE UP (ref 150–400)
POTASSIUM SERPL-MCNC: 4.6 MMOL/L — SIGNIFICANT CHANGE UP (ref 3.5–5.3)
POTASSIUM SERPL-SCNC: 4.6 MMOL/L — SIGNIFICANT CHANGE UP (ref 3.5–5.3)
PROT SERPL-MCNC: 8 G/DL — SIGNIFICANT CHANGE UP (ref 6.4–8.2)
PROT UR-MCNC: NEGATIVE MG/DL — SIGNIFICANT CHANGE UP
RBC # BLD: 4.55 M/UL — SIGNIFICANT CHANGE UP (ref 3.8–5.2)
RBC # FLD: 13.5 % — SIGNIFICANT CHANGE UP (ref 10.3–14.5)
SARS-COV-2 RNA SPEC QL NAA+PROBE: SIGNIFICANT CHANGE UP
SODIUM SERPL-SCNC: 140 MMOL/L — SIGNIFICANT CHANGE UP (ref 132–145)
SP GR SPEC: >=1.03 — SIGNIFICANT CHANGE UP (ref 1–1.03)
UROBILINOGEN FLD QL: 0.2 E.U./DL — SIGNIFICANT CHANGE UP
WBC # BLD: 7.63 K/UL — SIGNIFICANT CHANGE UP (ref 3.8–10.5)
WBC # FLD AUTO: 7.63 K/UL — SIGNIFICANT CHANGE UP (ref 3.8–10.5)

## 2022-03-03 PROCEDURE — 76830 TRANSVAGINAL US NON-OB: CPT | Mod: 26

## 2022-03-03 PROCEDURE — 76856 US EXAM PELVIC COMPLETE: CPT | Mod: 26

## 2022-03-03 PROCEDURE — 99285 EMERGENCY DEPT VISIT HI MDM: CPT

## 2022-03-03 PROCEDURE — 74177 CT ABD & PELVIS W/CONTRAST: CPT | Mod: 26

## 2022-03-03 RX ORDER — IOHEXOL 300 MG/ML
30 INJECTION, SOLUTION INTRAVENOUS ONCE
Refills: 0 | Status: COMPLETED | OUTPATIENT
Start: 2022-03-03 | End: 2022-03-03

## 2022-03-03 RX ORDER — MORPHINE SULFATE 50 MG/1
4 CAPSULE, EXTENDED RELEASE ORAL ONCE
Refills: 0 | Status: DISCONTINUED | OUTPATIENT
Start: 2022-03-03 | End: 2022-03-03

## 2022-03-03 RX ADMIN — IOHEXOL 30 MILLILITER(S): 300 INJECTION, SOLUTION INTRAVENOUS at 17:24

## 2022-03-03 RX ADMIN — MORPHINE SULFATE 4 MILLIGRAM(S): 50 CAPSULE, EXTENDED RELEASE ORAL at 22:58

## 2022-03-03 NOTE — ED PROVIDER NOTE - CLINICAL SUMMARY MEDICAL DECISION MAKING FREE TEXT BOX
Patient presents to the ED complaining of RLQ pain. Will rule out appendicitis, kidney stone and ovarian pathology. Declined pain meds at this time because she took 1 oxycodone prior to arrival. Patient presents to the ED complaining of RLQ pain. Will rule out appendicitis, kidney stone and ovarian pathology. Declined pain meds at this time because she took 1 oxycodone prior to arrival.    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 Weinstein from surgery will transfer pt to Replaced by [facility] for monitoring     pt agrees with plan    pt informed of all results pain controlled with morphine

## 2022-03-03 NOTE — ED PROVIDER NOTE - ATTENDING CONTRIBUTION TO CARE
Pt presents w hx of fibroids, presents w RLQ pain 1 week. labs unremarkable. CT w prominent appendix concern for early appy? Added US to assess ovaries. Pt might require surgical admission for appy evaluation.

## 2022-03-03 NOTE — ED PROVIDER NOTE - OBJECTIVE STATEMENT
52 y/o female with PMHx of sciatica, and fibroids, who had hernia repair and myomectomy presents to the ED complaining of RLQ pain for 1 week. Today, patient presents with nausea, but no vomiting, no fever, no chills, and no diarrhea. Patient states she has urinary frequency, but no dysuria. Denies vaginal bleeding or discharge. Pain radiates to right flank.

## 2022-03-03 NOTE — ED ADULT NURSE NOTE - OBJECTIVE STATEMENT
pt a&ox3 c/o RLQ pain x1 weeks. reports worsening pain with dizziness and nausea with urinary frequency. will continue to monitor.

## 2022-03-03 NOTE — ED ADULT TRIAGE NOTE - NSWEIGHTCALCTOOLDRUG_GEN_A_CORE
Thank you for allowing me to see your patient in [facility].  Please see the attached copy of our visit.  Sincerely,  Skyler White MD 
 used

## 2022-03-03 NOTE — ED ADULT TRIAGE NOTE - CHIEF COMPLAINT QUOTE
Patient presented to the ED w cc of RLQ abdominal pain x 2 days. Patient reports worsening pain today with lightheadedness. Patient reports pain radiates to lower back. Patient reports frequency of urination. Patient reports nausea nil vomiting. No diarrhea.

## 2022-03-03 NOTE — ED PROVIDER NOTE - PROGRESS NOTE DETAILS
Pt found to have large fibroids and enlarged appendix as well  will get pelvic sono  if no gyn pathology on pelvic sono [facility]  [facility]

## 2022-03-03 NOTE — ED PROVIDER NOTE - DOMESTIC TRAVEL HIGH RISK QUESTION
[facility] Hospitalist Progress Note    Subjective  Patient is on bed, she feels a little bit better.  She referred that her procedure was less painful.  Has some pain in the knee.  And was told that she probably will need oral antibiotic and may not need the IV line.    Objective      I/O's  No intake or output data in the 24 hours ending 06/08/21 1630    Last Recorded Vitals  Vitals with min/max:      Vital Last Value 24 Hour Range   Temperature 98.8 °F (37.1 °C) (06/08/21 1414) Temp  Min: 98.8 °F (37.1 °C)  Max: 99.1 °F (37.3 °C)   Pulse 84 (06/08/21 1414) Pulse  Min: 84  Max: 89   Respiratory 18 (06/08/21 1414) Resp  Min: 18  Max: 18   Non-Invasive  Blood Pressure (!) 144/84 (06/08/21 1414) BP  Min: 130/70  Max: 144/84   Pulse Oximetry 99 % (06/08/21 1414) SpO2  Min: 95 %  Max: 99 %   Arterial   Blood Pressure   No data recorded      Body mass index is 64.9 kg/m².    Physical Exam:  GENERAL:  In no apparent distress  HEENT: normocephalic, atraumatic, pupils reactive to light, conjunctiva is normal. Ear lobes normal, nose normal, throat moist no masses  Neck: Supple no JVD, no thyroid palpable, no bruit  CHEST:  Symmetric on expansion  LUNGS:  with clear breath sounds bilaterally.   CARDIAC:  Regular rate and rhythm.  S1 and S2  ABDOMEN:  Soft, there is R lower quadrant catheter which is drainage purulent material.   EXTREMITIES::  Good range of motion of all major joints. no calf tenderness    VASCULAR:  No Edema.  Peripheral pulses normal and equal in all extremities  NEUROLOGIC: AAOX3 no acute distress, normal tone, normal sensitivity, motor force, reflex normal.  PSYCH:  Seem sad    Labs     Recent Labs     06/06/21  0647 06/07/21  0530   WBC 11.7* 12.1*   RBC 2.72* 2.92*   HGB 7.5* 8.0*   HCT 24.1* 25.7*   * 456*   MCV 88.6 88.0   MCH 27.6 27.4   MCHC 31.1* 31.1*   NRBCRE 0 0   ASEG 9.8*  --    ALYMP 1.1  --    AMONO 0.6  --    AEO 0.2  --    ABAS 0.0  --          Recent Labs     06/06/21  0639  06/07/21  0530   SODIUM 138 138   POTASSIUM 3.9 4.0   CO2 26 27   ANIONGAP 11 10   GLUCOSE 101* 104*   BUN 10 8   CREATININE 0.83 0.76   BCRAT 12 11   CALCIUM 8.3* 8.3*        No results found     No results for input(s): INR, PT, PTT in the last 72 hours.     Imaging  IR SINOGRAM   Final Result by Mateo Andrea MD (06/07 2127)   Impression:      Completely resolved left pelvic abscess with no fistulous connection   identified.      Electronically Signed by: MATEO ANDREA MD    Signed on: 6/7/2021 9:27 PM          XR ABDOMEN 1 VIEW   Final Result by Maurice Gonsalez MD (06/05 0445)      1.   No dilated bowel loops or evidence of bowel obstruction.   2.   Surgical drain in the left lower abdomen.      Electronically Signed by: MAURICE GONSALEZ MD    Signed on: 6/5/2021 11:45 PM          CT GUIDED ABSCESS DRAINAGE   Final Result by Mateo Andrea MD (06/02 2122)   Impression:      Successful CT guided placement of left tubo-ovarian abscess drainage   catheter.      Electronically Signed by: MATEO ANDREA MD    Signed on: 6/2/2021 9:22 PM          CT CHEST ABDOMEN PELVIS W CONTRAST   Final Result by Trey, [facility] Incoming Radiant Results And Orders (05/31 0538)   1. Question mild inflammatory ground-glass opacities in the anteromedial left upper lobe. No significant acute consolidation    2. Small areas of pulmonary nodularity and or scarring. There also prominent left axillary lymph nodes. Given patient's known ovarian mass, recommend short-term follow-up to show stability/resolution          =========================   PROCEDURE INFORMATION:    Exam: CT Abdomen And Pelvis With Contrast    Exam date and time: 5/31/2021 4:50 AM    Age: 50 years old    Clinical indication: Leukocytosis, llq abd pain, L ovarian mass       TECHNIQUE:    Imaging protocol: Computed tomography of the abdomen and pelvis with contrast.    Radiation optimization: All CT scans at this facility use at least one of these dose optimization  techniques: automated exposure control; mA and/or kV adjustment per patient size (includes targeted exams where dose is matched to clinical indication); or    iterative reconstruction.    Contrast material: OMNI 300; Contrast volume: 100 ml; Contrast route: INTRAVENOUS (IV);        COMPARISON:    1. CTA CHEST PULMONARY EMBOLISM W CONTRAST EP 5/25/2021 3:39 PM    2. CT ABDOMEN PELVIS W CONTRAST EP 5/25/2021 11:12:05 AM       FINDINGS:    Liver: Unremarkable.    Gallbladder and bile ducts: Cholecystectomy.    Pancreas: Unremarkable.    Spleen: Normal.    Adrenal glands: Normal. No mass.    Kidneys and ureters: No significant findings.    Stomach and bowel: No bowel obstruction.    Appendix: No evidence of appendicitis.       Intraperitoneal space: Small amount of free fluid. No free air.    Vasculature: No acute vascular findings. No AAA.    Lymph nodes: Prominent retroperitoneal lymph nodes could be reactive.    Urinary bladder: Unremarkable as visualized.    Reproductive: Enlargement of irregular heterogeneous left adnexal mass measuring 10 x 9.5 x 7.6 cm previously 8 x 8.2 x 5.6 cm. Surrounding inflammation again seen. A small focus of internal gas is now noted.    Bones/joints: No acute findings.    Soft tissues: Ventral hernia containing transverse colon.       IMPRESSION:    1. Enlargement of inflammatory heterogeneous left adnexal mass; there is also a new focus of gas within this which could indicate superimposed necrosis or abscess formation, or possibly fistulization to adjacent sigmoid.    2. Other incidental findings       Electronically Signed by: ANTOINE WILLIAMSON M.D.   Signed on: 05/31/2021 05:38 AM      IR SINOGRAM    (Results Pending)         Assessment/Plan  50-year-old female with past medical history significant for morbid obesity, hypertension, CHF (EF 60-65%. RVSP 35mmHg, normal RV fxn), pHTN?, RAD, recent diagnosis of complex left ovarian mass (9 cm) and evaluated by gyn (5/25/21 after presenting  with LLQ pain) here today for evaluation of nausea, poor p.o. intake, persistent LLQ pain and dyspnea  * Abdominal pain, left lower quadrant  Assessment & Plan  Most probably due to the diverticulitis and tubo ovarian abscess  On antibiotics  IR inserted a catheter for drainage of abscess  Seem improved    Non-intractable vomiting with nausea  Assessment & Plan  Suspected that this is secondary to the oral metronidazole  We will discuss with ID about the possibility of changing antibiotic  Also Fern sublingual    Acute diverticulitis of intestine  Assessment & Plan  Fever, leukocytosis concerning for tuboovarian abscess vs diverticulitis    Pt with 2 episodes of diverticulitis 1/20 and 2/2021 and pt never has colonoscope done afterward due to pandemic  - wbc 24k at admit.       Started on rocephin and flagyl.  Seen by dr romero ID, change abx to meropenen and doxy Lactic acid level normal again today    sp IR drainage 6/1, cx growing strep and e coli and basteroid     dr hawk following.  Pt now on rocephin and flagyl    Wbc down to 14k to 11.7k today  -so far seem improved on liquid diet     Tubo-ovarian abscess  Assessment & Plan  -Most probably cause of the Left lower quadrant pain, nausea and emesis  - hx of left adnexal cystic mass dz last week. suppose to f/u gyn outpt  - repeat ct abd result noted enlarged inflmmatory heterogenous left    adnexal mass, gas within it ? Necrosis or abscess formation or       fistula.    - seen by gyn service. Case discussed.  Gyn concern for diverticulitis       vs tubovarian abscess     Ivf.  zofran prn    cont Pain control.  restart Clear liquid diet and advance as tolerate    Seen by Surgery dr balderas who rec colonscope 4-6 weeks.        Given obesity, pt would be higher risk for surgery       Sp IR drainage 310 ml purulent fluid 6/1      Was on doxy and meropenem, will change doxy to vanco and po       flagyl per ID 6/2 abscess culture growing e coli and strep, e coli        susceptibility noted. bcx neg to date        Wbc trending down to 18k today with better temperature          lactic level negative on 6/3        abx change per ID to rocephin and flagyl po and         off iv vanco and meropenen 6/4          will need picc/midline for abx once afebril > 24 hr and clear by ID.           tmax 99.7 past 24 hr. To have pick line today  6.7 IR to reposition drainage catheter. Continue antibiotics .  6.8 Pt Abscessogram completed yesterday- drain removed.       Pt would like to wait on PICC line placement at this time.      She will need IV antibiotics for 14 days    Acute renal failure (CMS/[facility])  Assessment & Plan  Monitor: The patient's CKD is  marlen likely due to infection  Evaluation:  cr up from 0.86 to 1 to 0.95 to 0.96 to 0.8  Assessment/Treatment:   has been on small ivf given hx chf and monitor    As cr stable and pt drinking and eating, hepblock iv on 6/4 but pt reports she not eating much and worry about dehydration.  Restart ivf at 50 ml/hr  Encourage oral fluid.  IV fluids were stopped    Iron deficiency anemia secondary to inadequate dietary iron intake  Assessment & Plan  - hgb trend down today but no sign of bleed    hgb 9.5 to 8.5 to 8.5 to 9.2 to 8.7 to 8.1 to 7.8 to 7.5 due to ivf     Monitor hgb closely given starts on lovenox higher dose 60 mg bid for dvt prophylaxis given bmi 63    Check fe and tibc in am    Fever  Assessment & Plan  Most probably due to diverticulitis and tuboovarian abcess.  On antibiotics, improved   Resolved     Morbid obesity with BMI of 60.0-69.9, adult (CMS/[facility])  Assessment & Plan    Monitor: The patient's morbid obesity is  Same   Evaluation:daily weight   Assessment/Treatment:  Advice weight loss and life style modifications    Lymphedema of both lower extremities  Assessment & Plan  Will monitor edema   Evaluate as outpatient       CHF (congestive heart failure) (CMS/[facility])  Assessment & Plan  Congestive heart failure due to pulmonary  hypertension and asthma  Evaluate: bnp, sign of chf  Assessment: Hear failure with preserved ejection fraction  Treatment, oxygen and diuretics, bb      Slow transit constipation  Assessment & Plan  - pt reports flatus but no bm since admit    Tolerating diet but no appetite     Has prn colace and milk of mag order since 5/31 but pt hasn't requested it     Change colace to bid.  Add senokot at bedtime and miralax daily 6/4 but pt has been refusing except colace this am due to pain with flatus    Has long discussion with pt regard bowel regimen   kub shows stool in rectal vault.    seen by dr price    pt has bm yest pm and today    Essential hypertension  Assessment & Plan  - on norvasc 5 mg bid outpt    bp 110's this am.  Pt reportedly not been taking bp med due to emesis past 2 days pta   norvasc has been on hold due to borderline bp    bp better today at 110's  6.7 patient bp is much better today    Pulmonary arterial hypertension (CMS/HCC)  Assessment & Plan  put on o2 overnight due to hypoxia at night    Pt not tolerating cpap outpt for sleep apnea and does not want to try hospital machine    Will need o2 walk before dc. If neg, then need noctural o2 evaluation    Pt not on home o2 before admit    JOSE ALBERTO (obstructive sleep apnea)  Assessment & Plan  Monitor patient sleep  So far not using c pap since she can not tolerate       PLAN FOR TODAY   PICC line inserted  Continue with antibiotics  Ambulate  Possible home in 1 to 2 days      Current Facility-Administered Medications   Medication Dose Route Frequency Provider Last Rate Last Admin   • HYDROmorphone (DILAUDID) 2 MG tablet 1 mg  1 mg Oral Once Nieves Robles PA-C       • metoCLOPramide (REGLAN) injection 10 mg  10 mg Intravenous Q6H PRN Wayne Tirado MD   10 mg at 06/07/21 0026   • sodium chloride 0.9% infusion   Intravenous Continuous Venecia Roa MD 50 mL/hr at 06/05/21 1516 New Bag at 06/05/21 1516   • enoxaparin (LOVENOX) injection 60 mg  60 mg  Subcutaneous BID Venecia Roa MD   60 mg at 06/08/21 1033   • cefTRIAXone (ROCEPHIN) syringe 2,000 mg  2,000 mg Intravenous Daily Serena Mcdermott PA-C   2,000 mg at 06/06/21 1331   • docusate sodium (COLACE) capsule 100 mg  100 mg Oral BID Venecia Roa MD   100 mg at 06/07/21 0928   • senna (SENOKOT) 8.6 mg  1 tablet Oral Nightly Venecia Roa MD   8.6 mg at 06/05/21 2247   • polyethylene glycol (MIRALAX) packet 17 g  17 g Oral Daily Venecia Roa MD       • lactulose (CHRONULAC) 10 GM/15ML solution 10 g  10 g Oral Daily PRN Venecia Roa MD       • bisacodyl (DULCOLAX) suppository 10 mg  10 mg Rectal Daily PRN Venecia Roa MD       • acetaminophen (TYLENOL) tablet 650 mg  650 mg Oral Q6H PRN Rebecca Waldron CNP   650 mg at 06/05/21 1622   • cholecalciferol (VITAMIN D) tablet 125 mcg  125 mcg Oral Daily Venecia Roa MD       • metroNIDAZOLE (FLAGYL) tablet 500 mg  500 mg Oral TID Serena Mcdermott PA-C   500 mg at 06/08/21 1416   • morphine injection 6 mg  6 mg Intravenous Once Anil Bryan MD       • fluticasone (FLONASE) 50 MCG/ACT nasal spray 1 spray  1 spray Each Nare Daily Venecia Roa MD   1 spray at 06/08/21 0928   • montelukast (SINGULAIR) tablet 10 mg  10 mg Oral Q Evening Venecia Roa MD   10 mg at 06/07/21 1842   • pantoprazole (PROTONIX) EC tablet 40 mg  40 mg Oral Nightly Venecia Roa MD   40 mg at 06/07/21 2133   • [Held by provider] amLODIPine (NORVASC) tablet 2.5 mg  2.5 mg Oral Daily Venecia Roa MD   Stopped at 06/05/21 0900   • ondansetron (ZOFRAN) injection 4 mg  4 mg Intravenous Q6H PRN Venecia Roa MD   4 mg at 06/06/21 2214   • traMADol (ULTRAM) tablet 50 mg  50 mg Oral Q6H PRN Venecia Roa MD   50 mg at 06/05/21 1504   • morphine injection 2 mg  2 mg Intravenous Q4H PRN Venecia Roa MD   2 mg at 06/03/21 1809   • magnesium hydroxide (MILK OF MAGNESIA) 400 MG/5ML suspension 30 mL  30 mL Oral Daily PRN Venecia Roa MD       • albuterol inhaler 2 puff  2 puff Inhalation Q6H Resp PRN Venecia Roa MD   2 puff at  05/31/21 2155       DVT Prophylaxis  Current Active Medications for DVT Prophylaxis (From admission, onward)         Stop     enoxaparin (LOVENOX) injection 60 mg  60 mg,   Subcutaneous,   2 TIMES DAILY      --                 IP CONSULT TO OB GYN  IP CONSULT TO PHYSICIAN  IP CONSULT TO PHYSICIAN  IP CONSULT TO PHYSICIAN     Discussed with patient, nurse,       Melisa Love MD  6/8/2021           No

## 2022-03-03 NOTE — ED ADULT NURSE NOTE - ED STAT RN HANDOFF DETAILS
received verbal handoff from WALDO Blair at 2000; patient waiting for CT results and dispo; no complaints at this time; will continue to monitor at this time

## 2022-03-04 ENCOUNTER — TRANSCRIPTION ENCOUNTER (OUTPATIENT)
Age: 52
End: 2022-03-04

## 2022-03-04 VITALS
DIASTOLIC BLOOD PRESSURE: 88 MMHG | RESPIRATION RATE: 16 BRPM | HEART RATE: 85 BPM | SYSTOLIC BLOOD PRESSURE: 146 MMHG | TEMPERATURE: 98 F | OXYGEN SATURATION: 98 %

## 2022-03-04 LAB
ALBUMIN SERPL ELPH-MCNC: 4.3 G/DL — SIGNIFICANT CHANGE UP (ref 3.3–5)
ALP SERPL-CCNC: 94 U/L — SIGNIFICANT CHANGE UP (ref 40–120)
ALT FLD-CCNC: 18 U/L — SIGNIFICANT CHANGE UP (ref 10–45)
ANION GAP SERPL CALC-SCNC: 7 MMOL/L — SIGNIFICANT CHANGE UP (ref 5–17)
APTT BLD: 32.7 SEC — SIGNIFICANT CHANGE UP (ref 27.5–35.5)
AST SERPL-CCNC: 18 U/L — SIGNIFICANT CHANGE UP (ref 10–40)
BASOPHILS # BLD AUTO: 0.02 K/UL — SIGNIFICANT CHANGE UP (ref 0–0.2)
BASOPHILS NFR BLD AUTO: 0.4 % — SIGNIFICANT CHANGE UP (ref 0–2)
BILIRUB SERPL-MCNC: 0.4 MG/DL — SIGNIFICANT CHANGE UP (ref 0.2–1.2)
BLD GP AB SCN SERPL QL: NEGATIVE — SIGNIFICANT CHANGE UP
BUN SERPL-MCNC: 7 MG/DL — SIGNIFICANT CHANGE UP (ref 7–23)
CALCIUM SERPL-MCNC: 10.2 MG/DL — SIGNIFICANT CHANGE UP (ref 8.4–10.5)
CHLORIDE SERPL-SCNC: 102 MMOL/L — SIGNIFICANT CHANGE UP (ref 96–108)
CO2 SERPL-SCNC: 28 MMOL/L — SIGNIFICANT CHANGE UP (ref 22–31)
CREAT SERPL-MCNC: 0.72 MG/DL — SIGNIFICANT CHANGE UP (ref 0.5–1.3)
EGFR: 101 ML/MIN/1.73M2 — SIGNIFICANT CHANGE UP
EOSINOPHIL # BLD AUTO: 0.06 K/UL — SIGNIFICANT CHANGE UP (ref 0–0.5)
EOSINOPHIL NFR BLD AUTO: 1.3 % — SIGNIFICANT CHANGE UP (ref 0–6)
GLUCOSE SERPL-MCNC: 95 MG/DL — SIGNIFICANT CHANGE UP (ref 70–99)
HCT VFR BLD CALC: 37.8 % — SIGNIFICANT CHANGE UP (ref 34.5–45)
HGB BLD-MCNC: 12.1 G/DL — SIGNIFICANT CHANGE UP (ref 11.5–15.5)
IMM GRANULOCYTES NFR BLD AUTO: 0 % — SIGNIFICANT CHANGE UP (ref 0–1.5)
INR BLD: 1.17 — HIGH (ref 0.88–1.16)
LYMPHOCYTES # BLD AUTO: 1.81 K/UL — SIGNIFICANT CHANGE UP (ref 1–3.3)
LYMPHOCYTES # BLD AUTO: 40.4 % — SIGNIFICANT CHANGE UP (ref 13–44)
MAGNESIUM SERPL-MCNC: 2.3 MG/DL — SIGNIFICANT CHANGE UP (ref 1.6–2.6)
MCHC RBC-ENTMCNC: 27.5 PG — SIGNIFICANT CHANGE UP (ref 27–34)
MCHC RBC-ENTMCNC: 32 GM/DL — SIGNIFICANT CHANGE UP (ref 32–36)
MCV RBC AUTO: 85.9 FL — SIGNIFICANT CHANGE UP (ref 80–100)
MONOCYTES # BLD AUTO: 0.43 K/UL — SIGNIFICANT CHANGE UP (ref 0–0.9)
MONOCYTES NFR BLD AUTO: 9.6 % — SIGNIFICANT CHANGE UP (ref 2–14)
NEUTROPHILS # BLD AUTO: 2.16 K/UL — SIGNIFICANT CHANGE UP (ref 1.8–7.4)
NEUTROPHILS NFR BLD AUTO: 48.3 % — SIGNIFICANT CHANGE UP (ref 43–77)
NRBC # BLD: 0 /100 WBCS — SIGNIFICANT CHANGE UP (ref 0–0)
PHOSPHATE SERPL-MCNC: 4.7 MG/DL — HIGH (ref 2.5–4.5)
PLATELET # BLD AUTO: 261 K/UL — SIGNIFICANT CHANGE UP (ref 150–400)
POTASSIUM SERPL-MCNC: 4.1 MMOL/L — SIGNIFICANT CHANGE UP (ref 3.5–5.3)
POTASSIUM SERPL-SCNC: 4.1 MMOL/L — SIGNIFICANT CHANGE UP (ref 3.5–5.3)
PROT SERPL-MCNC: 7.3 G/DL — SIGNIFICANT CHANGE UP (ref 6–8.3)
PROTHROM AB SERPL-ACNC: 13.9 SEC — HIGH (ref 10.5–13.4)
RBC # BLD: 4.4 M/UL — SIGNIFICANT CHANGE UP (ref 3.8–5.2)
RBC # FLD: 13.6 % — SIGNIFICANT CHANGE UP (ref 10.3–14.5)
RH IG SCN BLD-IMP: POSITIVE — SIGNIFICANT CHANGE UP
SODIUM SERPL-SCNC: 137 MMOL/L — SIGNIFICANT CHANGE UP (ref 135–145)
WBC # BLD: 4.48 K/UL — SIGNIFICANT CHANGE UP (ref 3.8–10.5)
WBC # FLD AUTO: 4.48 K/UL — SIGNIFICANT CHANGE UP (ref 3.8–10.5)

## 2022-03-04 PROCEDURE — 99285 EMERGENCY DEPT VISIT HI MDM: CPT | Mod: 25

## 2022-03-04 PROCEDURE — 87086 URINE CULTURE/COLONY COUNT: CPT

## 2022-03-04 PROCEDURE — 81025 URINE PREGNANCY TEST: CPT

## 2022-03-04 PROCEDURE — 84702 CHORIONIC GONADOTROPIN TEST: CPT

## 2022-03-04 PROCEDURE — 85025 COMPLETE CBC W/AUTO DIFF WBC: CPT

## 2022-03-04 PROCEDURE — 87635 SARS-COV-2 COVID-19 AMP PRB: CPT

## 2022-03-04 PROCEDURE — 81001 URINALYSIS AUTO W/SCOPE: CPT

## 2022-03-04 PROCEDURE — 84100 ASSAY OF PHOSPHORUS: CPT

## 2022-03-04 PROCEDURE — 86901 BLOOD TYPING SEROLOGIC RH(D): CPT

## 2022-03-04 PROCEDURE — 83735 ASSAY OF MAGNESIUM: CPT

## 2022-03-04 PROCEDURE — 86850 RBC ANTIBODY SCREEN: CPT

## 2022-03-04 PROCEDURE — 76830 TRANSVAGINAL US NON-OB: CPT

## 2022-03-04 PROCEDURE — 85730 THROMBOPLASTIN TIME PARTIAL: CPT

## 2022-03-04 PROCEDURE — 36415 COLL VENOUS BLD VENIPUNCTURE: CPT

## 2022-03-04 PROCEDURE — 74177 CT ABD & PELVIS W/CONTRAST: CPT

## 2022-03-04 PROCEDURE — 85610 PROTHROMBIN TIME: CPT

## 2022-03-04 PROCEDURE — 86900 BLOOD TYPING SEROLOGIC ABO: CPT

## 2022-03-04 PROCEDURE — 76856 US EXAM PELVIC COMPLETE: CPT

## 2022-03-04 PROCEDURE — 83690 ASSAY OF LIPASE: CPT

## 2022-03-04 PROCEDURE — 80053 COMPREHEN METABOLIC PANEL: CPT

## 2022-03-04 RX ORDER — HEPARIN SODIUM 5000 [USP'U]/ML
5000 INJECTION INTRAVENOUS; SUBCUTANEOUS EVERY 8 HOURS
Refills: 0 | Status: DISCONTINUED | OUTPATIENT
Start: 2022-03-04 | End: 2022-03-04

## 2022-03-04 RX ORDER — ACETAMINOPHEN 500 MG
1000 TABLET ORAL EVERY 6 HOURS
Refills: 0 | Status: DISCONTINUED | OUTPATIENT
Start: 2022-03-04 | End: 2022-03-04

## 2022-03-04 RX ORDER — IBUPROFEN 200 MG
1 TABLET ORAL
Qty: 0 | Refills: 0 | DISCHARGE
Start: 2022-03-04

## 2022-03-04 RX ORDER — HYDROMORPHONE HYDROCHLORIDE 2 MG/ML
0.5 INJECTION INTRAMUSCULAR; INTRAVENOUS; SUBCUTANEOUS EVERY 4 HOURS
Refills: 0 | Status: DISCONTINUED | OUTPATIENT
Start: 2022-03-04 | End: 2022-03-04

## 2022-03-04 RX ORDER — IBUPROFEN 200 MG
600 TABLET ORAL EVERY 6 HOURS
Refills: 0 | Status: DISCONTINUED | OUTPATIENT
Start: 2022-03-04 | End: 2022-03-04

## 2022-03-04 RX ORDER — ACETAMINOPHEN 500 MG
1000 TABLET ORAL ONCE
Refills: 0 | Status: DISCONTINUED | OUTPATIENT
Start: 2022-03-04 | End: 2022-03-04

## 2022-03-04 RX ORDER — HYDROMORPHONE HYDROCHLORIDE 2 MG/ML
0.25 INJECTION INTRAMUSCULAR; INTRAVENOUS; SUBCUTANEOUS EVERY 4 HOURS
Refills: 0 | Status: DISCONTINUED | OUTPATIENT
Start: 2022-03-04 | End: 2022-03-04

## 2022-03-04 RX ORDER — ONDANSETRON 8 MG/1
4 TABLET, FILM COATED ORAL EVERY 6 HOURS
Refills: 0 | Status: DISCONTINUED | OUTPATIENT
Start: 2022-03-04 | End: 2022-03-04

## 2022-03-04 RX ORDER — SODIUM CHLORIDE 9 MG/ML
1000 INJECTION, SOLUTION INTRAVENOUS
Refills: 0 | Status: DISCONTINUED | OUTPATIENT
Start: 2022-03-04 | End: 2022-03-04

## 2022-03-04 RX ORDER — OXYCODONE AND ACETAMINOPHEN 5; 325 MG/1; MG/1
1 TABLET ORAL
Qty: 0 | Refills: 0 | DISCHARGE
Start: 2022-03-04

## 2022-03-04 RX ADMIN — HEPARIN SODIUM 5000 UNIT(S): 5000 INJECTION INTRAVENOUS; SUBCUTANEOUS at 06:45

## 2022-03-04 RX ADMIN — HEPARIN SODIUM 5000 UNIT(S): 5000 INJECTION INTRAVENOUS; SUBCUTANEOUS at 14:01

## 2022-03-04 RX ADMIN — Medication 600 MILLIGRAM(S): at 10:26

## 2022-03-04 RX ADMIN — Medication 600 MILLIGRAM(S): at 11:26

## 2022-03-04 RX ADMIN — MORPHINE SULFATE 4 MILLIGRAM(S): 50 CAPSULE, EXTENDED RELEASE ORAL at 02:26

## 2022-03-04 RX ADMIN — SODIUM CHLORIDE 110 MILLILITER(S): 9 INJECTION, SOLUTION INTRAVENOUS at 06:12

## 2022-03-04 NOTE — PROGRESS NOTE ADULT - SUBJECTIVE AND OBJECTIVE BOX
SUBJECTIVE: Pt seen and examined by chief resident. Pt is doing well, resting comfortably on bed. Still reporting some abdominal pain. States that pain is a 6/10 this AM. Better than at the time of admission. Has an appetite.     Vital Signs Last 24 Hrs  T(C): 36.7 (04 Mar 2022 02:40), Max: 37 (03 Mar 2022 19:33)  T(F): 98 (04 Mar 2022 02:40), Max: 98.6 (03 Mar 2022 19:33)  HR: 98 (04 Mar 2022 02:40) (74 - 98)  BP: 157/91 (04 Mar 2022 02:40) (133/83 - 160/84)  BP(mean): --  RR: 20 (04 Mar 2022 02:40) (16 - 20)  SpO2: 99% (04 Mar 2022 02:40) (98% - 99%)      Physical Exam:  General Appearance: Appears well, NAD  Pulmonary: Nonlabored breathing, no respiratory distress  Cardiovascular: NSR  Abdomen: Soft, nondisteded, tender in the RLQ. No rebound or guarding.   Extremities: WWP, SCD's in place     LABS:                        12.1   4.48  )-----------( 261      ( 04 Mar 2022 06:31 )             37.8     03-04    137  |  102  |  7   ----------------------------<  95  4.1   |  28  |  0.72    Ca    10.2      04 Mar 2022 06:31  Phos  4.7     03-04  Mg     2.3     03-04    TPro  7.3  /  Alb  4.3  /  TBili  0.4  /  DBili  x   /  AST  18  /  ALT  18  /  AlkPhos  94  03-04    PT/INR - ( 04 Mar 2022 06:31 )   PT: 13.9 sec;   INR: 1.17          PTT - ( 04 Mar 2022 06:31 )  PTT:32.7 sec  Urinalysis Basic - ( 03 Mar 2022 17:28 )    Color: Yellow / Appearance: Clear / SG: >=1.030 / pH: x  Gluc: x / Ketone: NEGATIVE  / Bili: NEGATIVE / Urobili: 0.2 E.U./dL   Blood: x / Protein: NEGATIVE mg/dL / Nitrite: NEGATIVE   Leuk Esterase: NEGATIVE / RBC: 5-10 /HPF / WBC < 5 /HPF   Sq Epi: x / Non Sq Epi: 0-5 /HPF / Bacteria: Present /HPF

## 2022-03-04 NOTE — DISCHARGE NOTE PROVIDER - NSDCFUADDINST_GEN_ALL_CORE_FT
Please follow up with your GYN in 1-2 weeks; you may call the office to make an appointment at your earliest convenience.  Please continue to take NSAIDS as needed every 6 hours for pain control     [facility] with [facility] as needed; you may call the office to make an appointment at your earliest convenience.    General Discharge Instructions:  Please resume all regular home medications unless specifically advised not to take a particular medication. Also, please take any new medications as prescribed.  Please get plenty of rest, continue to ambulate several times per day, and drink adequate amounts of fluids.   Avoid driving or operating heavy machinery while taking pain medications.  Please follow-up with your GYN and Primary Care Provider (PCP) as advised.        Warning Signs:  Please call your doctor if you experience the following:  *You experience new chest pain, pressure, squeezing or tightness.  *New or worsening cough, shortness of breath, or wheeze.  *If you are vomiting and cannot keep down fluids or your medications.  *You are getting dehydrated due to continued vomiting, diarrhea, or other reasons. Signs of dehydration include dry mouth, rapid heartbeat, or feeling dizzy or faint when standing.  *You see blood or dark/black material when you vomit or have a bowel movement.  *You experience burning when you urinate, have blood in your urine, or experience a discharge.  *Your pain is not improving within 8-12 hours or is not gone within 24 hours. Call or return immediately if your pain is getting worse, changes location, or moves to your chest or back.  *You have shaking chills, or fever greater than 101.5 degrees Fahrenheit or 38 degrees Celsius.  *Any change in your symptoms, or any new symptoms that concern you.

## 2022-03-04 NOTE — DISCHARGE NOTE NURSING/CASE MANAGEMENT/SOCIAL WORK - NSDCPEFALRISK_GEN_ALL_CORE
For information on Fall & Injury Prevention, visit: [email or web address] OR  [email or web address] OR  https://www.cdc.gov/steadi/patient.html

## 2022-03-04 NOTE — CONSULT NOTE ADULT - SUBJECTIVE AND OBJECTIVE BOX
51y G0  LMP 1/10/22 presents with RLQ x1 weeks, admitted to surgery team for possible appy. Reports throbbing constant pain since sunday, that varies in severity. Currently in 6/10 pain. Reports some associated nausea and dizziness yesterday. Today denies f/c, n/v, sob, chest pain, vaginal bleeding, vaginal discharge, urinary sx.     OBHx: denies  Gyn H/x:  LMP: 1/10/22, perimenopause (irreg menses q1-2month)  Known fibroid uterus  HSV2+  Denies H/x of cysts, endometriosis, STIs  STIs: denies h/x of HIV, RPR, Hepatitis, G/C, Trich  Last Pap: 1 year ago, wnl per patient  GYN Physician: private md in [place]  PMH: denies  PSH:   Meds: none  NKDA       T(C): 36.8 (03-04-22 @ 08:45), Max: 37 (03-03-22 @ 19:33)  HR: 80 (03-04-22 @ 08:45) (74 - 98)  BP: 154/92 (03-04-22 @ 08:45) (133/83 - 160/84)  RR: 16 (03-04-22 @ 08:45) (16 - 20)  SpO2: 98% (03-04-22 @ 08:45) (98% - 99%)    PHYSICAL EXAM:   Gen: NAD  GI: soft, nontender, nondistended + BS, no rebound no guarding  : normal external genitalia   BME: normal anteverted uterus, no adnexal masses appreciated    Spec: cervix closed, no abnormal discharge/vaginal bleeding  Ext: wnl        LABS:                        12.1   4.48  )-----------( 261      ( 04 Mar 2022 06:31 )             37.8     03-04    137  |  102  |  7   ----------------------------<  95  4.1   |  28  |  0.72    Ca    10.2      04 Mar 2022 06:31  Phos  4.7     03-04  Mg     2.3     03-04    TPro  7.3  /  Alb  4.3  /  TBili  0.4  /  DBili  x   /  AST  18  /  ALT  18  /  AlkPhos  94  03-04    PT/INR - ( 04 Mar 2022 06:31 )   PT: 13.9 sec;   INR: 1.17          PTT - ( 04 Mar 2022 06:31 )  PTT:32.7 sec  Urinalysis Basic - ( 03 Mar 2022 17:28 )    Color: Yellow / Appearance: Clear / SG: >=1.030 / pH: x  Gluc: x / Ketone: NEGATIVE  / Bili: NEGATIVE / Urobili: 0.2 E.U./dL   Blood: x / Protein: NEGATIVE mg/dL / Nitrite: NEGATIVE   Leuk Esterase: NEGATIVE / RBC: 5-10 /HPF / WBC < 5 /HPF   Sq Epi: x / Non Sq Epi: 0-5 /HPF / Bacteria: Present /HPF          RADIOLOGY & ADDITIONAL STUDIES:   51y G0  LMP 1/10/22 presents with RLQ x1 weeks, admitted to surgery team for possible appy. Reports throbbing constant pain since sunday, that varies in severity. Currently in 6/10 pain. Reports some associated nausea and dizziness yesterday. Today denies f/c, n/v, sob, chest pain, vaginal bleeding, vaginal discharge, urinary sx.     OBHx: denies  Gyn H/x:  LMP: 1/10/22, perimenopause (irreg menses q1-2month)  Known fibroid uterus  HSV2+  Denies H/x of cysts, endometriosis, STIs  STIs: denies h/x of HIV, RPR, Hepatitis, G/C, Trich  Last Pap: 1 year ago, wnl per patient  GYN Physician: private md in [place]  PMH: katherin  PSH: umbilical hernia repair, abdominal myomectomy 2008  Meds: none  NKDA       T(C): 36.8 (03-04-22 @ 08:45), Max: 37 (03-03-22 @ 19:33)  HR: 80 (03-04-22 @ 08:45) (74 - 98)  BP: 154/92 (03-04-22 @ 08:45) (133/83 - 160/84)  RR: 16 (03-04-22 @ 08:45) (16 - 20)  SpO2: 98% (03-04-22 @ 08:45) (98% - 99%)    PHYSICAL EXAM:   Gen: NAD  GI: soft, mild RLQ tenderness, nondistended + BS, no rebound no guarding  : normal external genitalia   BME: 12w sized bulky anteverted uterus, no adnexal masses or tenderness appreciated    Spec: cervix closed, no abnormal discharge/vaginal bleeding  Ext: wnl        LABS:                        12.1   4.48  )-----------( 261      ( 04 Mar 2022 06:31 )             37.8     03-04    137  |  102  |  7   ----------------------------<  95  4.1   |  28  |  0.72    Ca    10.2      04 Mar 2022 06:31  Phos  4.7     03-04  Mg     2.3     03-04    TPro  7.3  /  Alb  4.3  /  TBili  0.4  /  DBili  x   /  AST  18  /  ALT  18  /  AlkPhos  94  03-04    PT/INR - ( 04 Mar 2022 06:31 )   PT: 13.9 sec;   INR: 1.17          PTT - ( 04 Mar 2022 06:31 )  PTT:32.7 sec  Urinalysis Basic - ( 03 Mar 2022 17:28 )    Color: Yellow / Appearance: Clear / SG: >=1.030 / pH: x  Gluc: x / Ketone: NEGATIVE  / Bili: NEGATIVE / Urobili: 0.2 E.U./dL   Blood: x / Protein: NEGATIVE mg/dL / Nitrite: NEGATIVE   Leuk Esterase: NEGATIVE / RBC: 5-10 /HPF / WBC < 5 /HPF   Sq Epi: x / Non Sq Epi: 0-5 /HPF / Bacteria: Present /HPF          RADIOLOGY & ADDITIONAL STUDIES:    < from: US Pelvis Complete (US Pelvis Complete .) (03.03.22 @ 22:06) >  COMPARISON: Same day CT of the abdomen and pelvis.    TECHNIQUE:  Endovaginal pelvic sonogram as per order. Transabdominal pelvic sonogram   was also performed as part of our standard protocol. Color and Spectral   Doppler was performed.    FINDINGS:    Uterus: 12 x 9 x 9 cm. Numerous fibroids with the largest measuring up to   6.4 cm.  Endometrium: Poorly visualized due to fibroids.    Ovaries: Not visualized due to posterior shadowing by large uterine   fibroids and overlying bowel gas.    Fluid: None.    IMPRESSION:  1.  Bilateral ovaries not visualized due to posterior shadowing from   large uterine fibroids and overlying bowel gas.  2.  Bulky multifibroid uterus.    < end of copied text >

## 2022-03-04 NOTE — DISCHARGE NOTE PROVIDER - CARE PROVIDER_API CALL
Hemanth Monae)  Surgery  [address] [address]  Phone: [phone]  Fax: [phone]  Follow Up Time:

## 2022-03-04 NOTE — CONSULT NOTE ADULT - ASSESSMENT
A/P: 51y G0  LMP 1/10/22 presents with RLQ, low suspcion for appy per primary team, r/o ovarian torsions  - Hemodynamically stable  - Given benign exam with no acute tenderness or peritoneal signs, low suspicion for ovarian torsion. However, unable to visualize adnexa on imaging   - Pain could possibly be attributed to a degenerating fibroid. Recommend treatment with NSAIDs for pain relief   - No acute GYN intervention at this time. GYN team to sign off. Please reconsult with any concerns [phone]    D/w Dr. Dainca Jose PA-C

## 2022-03-04 NOTE — DISCHARGE NOTE PROVIDER - NSDCMRMEDTOKEN_GEN_ALL_CORE_FT
ibuprofen 600 mg oral tablet: 1 tab(s) orally every 6 hours, As needed, Mild Pain (1 - 3), Moderate Pain (4 - 6), Severe Pain (7 - 10)  methocarbamol 500 mg oral tablet: 2 tab(s) orally 3 times a day    ibuprofen 600 mg oral tablet: 1 tab(s) orally every 6 hours, As needed, Mild Pain (1 - 3), Moderate Pain (4 - 6), Severe Pain (7 - 10)  methocarbamol 500 mg oral tablet: 2 tab(s) orally 3 times a day   Percocet 5 mg-325 mg oral tablet: 1 tab(s) orally every 6 hours

## 2022-03-04 NOTE — DISCHARGE NOTE PROVIDER - HOSPITAL COURSE
51F w/ fibroids and sciatica w/ RLQ pain x1 wk with worsening pain x2d. In the ED, AVSS,  all labs unremarkable, WBC 7.6, CT which showed 8mm appendix unchanged from prior (9mm appendix 1 yr ago) w/o evidence of inflammation. TVUS performed showed fibroid uterus but was unable to visualize the ovaries. Clinical picture not consistent with appendicitis so GYN was consulted for gyn etiology. Per exam, benign exam with no acute tenderness or peritoneal signs, low suspicion for ovarian torsion. Pain could possibly be attributed to a degenerating fibroid. Pt started on treatment with NSAIDs for pain relief and pain improved and diet was advanced/tolerated. On day of discharge pt was stable for dc home with outpt gyn follow up.    51F w/ fibroids and sciatica w/ RLQ pain x1 wk with worsening pain x2d. In the ED, AVSS,  all labs unremarkable, WBC 7.6, CT which showed 8mm appendix unchanged from prior (9mm appendix 1 yr ago) w/o evidence of inflammation. TVUS performed showed fibroid uterus but was unable to visualize the ovaries. Clinical picture not consistent with appendicitis so GYN was consulted for gyn etiology. Per exam, benign exam with no acute tenderness or peritoneal signs, low suspicion for ovarian torsion. Pain could possibly be attributed to a degenerating fibroid. Pt started on treatment with NSAIDs for pain relief and pain improved and diet was advanced/tolerated. On day of discharge pt was stable for dc home with outpt gyn follow up.     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 Pt informed to follow up with outpt GYN and to return to ED if sx return. Pt confirmed understanding.

## 2022-03-04 NOTE — H&P ADULT - NSHPPHYSICALEXAM_GEN_ALL_CORE
GEN: NAD, resting comfortably in bed  HEENT: MMM  CV: RRR  Resp: nonlabored breathing, no respiratory distress  Abd: soft, mild RLQ TTP, nondistended, nonperitonitic, no rebound or guarding  Ext: WWP, no edema, no calf tenderness  Neuro: no focal deficits  Psych: pleasant

## 2022-03-04 NOTE — DISCHARGE NOTE NURSING/CASE MANAGEMENT/SOCIAL WORK - PATIENT PORTAL LINK FT
You can access the FollowMyHealth Patient Portal offered by [facility] by registering at the following website: [email or web address]. By joining [facility]’s FollowMyHealth portal, you will also be able to view your health information using other applications (apps) compatible with our system.

## 2022-03-04 NOTE — H&P ADULT - NSHPLABSRESULTS_GEN_ALL_CORE
12.4   7.63  )-----------( 269      ( 03 Mar 2022 16:42 )             39.3   03-03    140  |  104  |  9   ----------------------------<  141<H>  4.6   |  30  |  0.71    Ca    10.0      03 Mar 2022 16:42    TPro  8.0  /  Alb  3.9  /  TBili  0.3  /  DBili  x   /  AST  22  /  ALT  28  /  AlkPhos  99  03-03      CT 3/3:  Mildly prominent appendix appears similar to previous exam. No new findings to suggest acute appendicitis.  Enlarged bulky leiomyomatous uterus, grossly unchanged.    TVUS 3/3:  1. Bilateral ovaries not visualized due to posterior shadowing from large uterine fibroids and overlying bowel gas.  2. Bulky multifibroid uterus.

## 2022-03-04 NOTE — H&P ADULT - ASSESSMENT
51F w/ fibroids and sciatica w/ RLQ pain x1 wk with worsening pain x2d. Findings concerning for rule appendicitis. Pt underwent CT which showed 8mm appendix unchanged from prior w/o evidence of inflammation. TVUS and CT limited for evaluation of ovaries 2/2 fibroid uterus. Differential includes acute on chronic appendicitis, ovarian torsion, TOA.    NPO/IVF  Holding Abx  Pain/nausea PRN  Gyn c/s  SCDs/SQH  OOBA  AM labs

## 2022-03-04 NOTE — H&P ADULT - HISTORY OF PRESENT ILLNESS
51F w/ fibroids, sciatica, myomectomy, umbilical hernia repair presents w/ 1wk RLQ pain. States her pain was slow in onset, achy and max of 10/10 in character. Located in her RLQ w/ radiations to her back and down her inner R thigh. 2 days ago began having nausea w/o c/d. Denies Crohns, UC, colon ca. Last Cscope 3 years ago that she states was normal. LMP was 1/22, denies current vaginal bleeding. Denies hx of STIs or ovarian cysts. Endorses urinary frequency.    In the ED she was afebrile and hemodynamically wnl. All labs were unremarkable, with a WBC of 7.6. CT AP performed showed dilated appendix to 8mm, unchanged from previous 9mm dilation, without evidence of acute appendicitis. TVUS performed showed fibroid uterus but was unable to visualize the ovaries.

## 2022-03-05 LAB
CULTURE RESULTS: SIGNIFICANT CHANGE UP
SPECIMEN SOURCE: SIGNIFICANT CHANGE UP

## 2022-03-08 DIAGNOSIS — R10.31 RIGHT LOWER QUADRANT PAIN: ICD-10-CM

## 2022-03-08 DIAGNOSIS — Z98.890 OTHER SPECIFIED POSTPROCEDURAL STATES: ICD-10-CM

## 2022-03-08 DIAGNOSIS — D25.9 LEIOMYOMA OF UTERUS, UNSPECIFIED: ICD-10-CM

## 2022-03-08 DIAGNOSIS — Z28.21 IMMUNIZATION NOT CARRIED OUT BECAUSE OF PATIENT REFUSAL: ICD-10-CM

## 2022-03-29 ENCOUNTER — APPOINTMENT (OUTPATIENT)
Dept: RHEUMATOLOGY | Facility: CLINIC | Age: 52
End: 2022-03-29

## 2023-08-02 NOTE — ED PROVIDER NOTE - EYES, MLM
[FreeTextEntry1] : Venipuncture with labs drawn in office.  Obtained and reviewed pulmonary function test chest x-ray results with patient today.  Ordered noncontrast chest CT scan to further evaluate her underlying lung parenchyma. Proper use and technique for inhaler demonstrated and explained.  Prescribed Trelegy Ellipta inhaler 100/62.5/25 mcg 1 puff once a day for emphysema treatment. Return for pulmonary follow up in 2 weeks. Medications reviewed. Continue present medications.
Clear bilaterally, pupils equal, round and reactive to light.

## 2024-01-26 NOTE — PATIENT PROFILE ADULT - VISION (WITH CORRECTIVE LENSES IF THE PATIENT USUALLY WEARS THEM):
Normal vision: sees adequately in most situations; can see medication labels, newsprint What Type Of Note Output Would You Prefer (Optional)?: Standard Output How Severe Is Your Rash?: mild Is This A New Presentation, Or A Follow-Up?: Rash

## 2024-06-04 NOTE — ED ADULT NURSE NOTE - ALCOHOL PRE SCREEN (AUDIT - C)
Statement Selected
1) Rec'd to change diet to CHO Consistent w/ evening snack  2) Add premier protein shake TID (provides 160kcal, 30g protein)  3) Monitor bowel movements, if no BM for >3 days, consider implementing bowel regimen.  4) Encourage protein-rich foods, maximize food preferences  5) MVI w/ minerals daily to ensure 100% RDA met  6) Consider adding thiamine 100 mg daily 2/2 poor PO intake/ malnutrition  7) Monitor and optimize BG levels between 140-180 mg/dL by medical management  8) Monitor lytes/ min and replete prn.  9) Encourage f/u with outpatient dietitian and endocrinologist for diabetes management  10) Confirm goals of care regarding nutrition support  RD will continue to monitor PO intake, labs, hydration, and wt prn.

## 2025-05-15 NOTE — ED ADULT NURSE NOTE - ALCOHOL PRE SCREEN (AUDIT - C)
As discussed your symptoms are appearing most likely paresthesia of versus alcoholic neuropathy but sometimes causes of symptoms but cannot exclude possible shingles as well but since you do not have a rash I am unable to give oral shingles therapy at this time.    Will go ahead and prescribe you topical applications of acyclovir once daily on the affected area and the meantime you can use the capsaicin external cream for the paresthesias.  I am unsure if your insurance will be able to cover these medications but I have already sent it to your pharmacy.      
Statement Selected